# Patient Record
Sex: MALE | Race: WHITE | NOT HISPANIC OR LATINO | Employment: UNEMPLOYED
[De-identification: names, ages, dates, MRNs, and addresses within clinical notes are randomized per-mention and may not be internally consistent; named-entity substitution may affect disease eponyms.]

---

## 2019-11-21 ENCOUNTER — RECORDS - HEALTHEAST (OUTPATIENT)
Dept: ADMINISTRATIVE | Facility: OTHER | Age: 24
End: 2019-11-21

## 2019-12-17 ENCOUNTER — DOCUMENTATION ONLY (OUTPATIENT)
Dept: OTHER | Facility: CLINIC | Age: 24
End: 2019-12-17

## 2019-12-17 RX ORDER — PANTOPRAZOLE SODIUM 40 MG/1
40 TABLET, DELAYED RELEASE ORAL EVERY EVENING
COMMUNITY

## 2019-12-17 RX ORDER — LISINOPRIL 10 MG/1
10 TABLET ORAL DAILY
Status: ON HOLD | COMMUNITY
End: 2024-06-22

## 2019-12-18 ENCOUNTER — HOSPITAL ENCOUNTER (OUTPATIENT)
Facility: CLINIC | Age: 24
Discharge: HOME | End: 2019-12-18
Attending: INTERNAL MEDICINE | Admitting: INTERNAL MEDICINE
Payer: COMMERCIAL

## 2019-12-18 VITALS
RESPIRATION RATE: 15 BRPM | WEIGHT: 225 LBS | BODY MASS INDEX: 36.16 KG/M2 | DIASTOLIC BLOOD PRESSURE: 90 MMHG | HEART RATE: 82 BPM | HEIGHT: 66 IN | SYSTOLIC BLOOD PRESSURE: 127 MMHG | OXYGEN SATURATION: 96 %

## 2019-12-18 LAB — UPPER GI ENDOSCOPY: NORMAL

## 2019-12-18 PROCEDURE — 25000128 H RX IP 250 OP 636: Performed by: INTERNAL MEDICINE

## 2019-12-18 PROCEDURE — 43235 EGD DIAGNOSTIC BRUSH WASH: CPT | Performed by: INTERNAL MEDICINE

## 2019-12-18 PROCEDURE — 25000125 ZZHC RX 250: Performed by: INTERNAL MEDICINE

## 2019-12-18 PROCEDURE — 40000104 ZZH STATISTIC MODERATE SEDATION < 10 MIN: Performed by: INTERNAL MEDICINE

## 2019-12-18 RX ORDER — LIDOCAINE 40 MG/G
CREAM TOPICAL
Status: DISCONTINUED | OUTPATIENT
Start: 2019-12-18 | End: 2019-12-18 | Stop reason: HOSPADM

## 2019-12-18 RX ORDER — NALOXONE HYDROCHLORIDE 0.4 MG/ML
.1-.4 INJECTION, SOLUTION INTRAMUSCULAR; INTRAVENOUS; SUBCUTANEOUS
Status: DISCONTINUED | OUTPATIENT
Start: 2019-12-18 | End: 2019-12-18 | Stop reason: HOSPADM

## 2019-12-18 RX ORDER — FENTANYL CITRATE 50 UG/ML
INJECTION, SOLUTION INTRAMUSCULAR; INTRAVENOUS PRN
Status: DISCONTINUED | OUTPATIENT
Start: 2019-12-18 | End: 2019-12-18 | Stop reason: HOSPADM

## 2019-12-18 RX ORDER — ONDANSETRON 2 MG/ML
4 INJECTION INTRAMUSCULAR; INTRAVENOUS
Status: DISCONTINUED | OUTPATIENT
Start: 2019-12-18 | End: 2019-12-18 | Stop reason: HOSPADM

## 2019-12-18 RX ORDER — ONDANSETRON 2 MG/ML
4 INJECTION INTRAMUSCULAR; INTRAVENOUS EVERY 6 HOURS PRN
Status: DISCONTINUED | OUTPATIENT
Start: 2019-12-18 | End: 2019-12-18 | Stop reason: HOSPADM

## 2019-12-18 RX ORDER — FLUMAZENIL 0.1 MG/ML
0.2 INJECTION, SOLUTION INTRAVENOUS
Status: DISCONTINUED | OUTPATIENT
Start: 2019-12-18 | End: 2019-12-18 | Stop reason: HOSPADM

## 2019-12-18 RX ORDER — ONDANSETRON 4 MG/1
4 TABLET, ORALLY DISINTEGRATING ORAL EVERY 6 HOURS PRN
Status: DISCONTINUED | OUTPATIENT
Start: 2019-12-18 | End: 2019-12-18 | Stop reason: HOSPADM

## 2019-12-18 ASSESSMENT — MIFFLIN-ST. JEOR: SCORE: 1943.34

## 2019-12-18 NOTE — DISCHARGE INSTRUCTIONS
"The patient has received a copy of the Provation  report the doctor has written and discharge instructions have been discussed with the patient and responsible adult.  All questions were addressed and answered prior to patient discharge.      Tips to Control Acid Reflux  To control acid reflux, you ll need to make some basic diet and lifestyle changes. The simple steps outlined below may be all you ll need to relieve discomfort.   Watch What You Eat      Avoid fatty foods and spicy foods.    Eat fewer acidic foods, such as citrus and tomato-based foods. These can increase symptoms.     Limit drinking alcohol, caffeine, and fizzy beverages. All increase acid reflux.    Try limiting chocolate, peppermint, and spearmint. These can worsen acid reflux in some people.    Watch When You Eat    Avoid lying down for 3 hours after eating.    Do not snack before going to bed.    Raise Your Head  Raising your head and upper body by 4\" to 6\" helps limit reflux when you re lying down. Put blocks under the head of the bed frame to raise it.                    [id number] [facility], [address] [place] [address], [place] [address]. All rights reserved. This information is not intended as a substitute for professional medical care. Always follow your healthcare professional's instructions.  Examples of Bed Risers                                                                        "

## 2019-12-18 NOTE — PROGRESS NOTES
[facility]  Pre-Endoscopy History and Physical     Dilshad Causey MRN# [id number]   YOB: 1995 Age: 24 year old   Today's Date: 12/18/2019    Date of Procedure: 12/18/2019  Primary care provider: Laura Clemens  Type of Endoscopy: esophagogastroduodenoscopy (upper GI endoscopy)  Reason for Procedure: Reflux  Type of Anesthesia Anticipated: Moderate (conscious) sedation    HPI:    Dilshad is a 24 year old male who will be undergoing the above procedure.      A history and physical has been performed. The patient's medications and allergies have been reviewed. The risks and benefits of the procedure and the sedation options and risks were discussed with the patient.  All questions were answered and informed consent was obtained.      Allergies   Allergen Reactions     Alcohol [Ethanol] Hives     Hives after drinking ETOH     Cefprozil [Cefprozil] Hives        Current Facility-Administered Medications   Medication     0.9% sodium chloride BOLUS     lidocaine (LMX4) kit     lidocaine 1 % 0.1-1 mL     ondansetron (ZOFRAN) injection 4 mg     sodium chloride (PF) 0.9% PF flush 3 mL     sodium chloride (PF) 0.9% PF flush 3 mL     sodium chloride (PF) 0.9% PF flush 3 mL       Patient Active Problem List   Diagnosis     Tibialis anterior tenosynovitis     Lower leg pain        Past Medical History:   Diagnosis Date     Acne     on amoxicillin     Hypertension      Migraines         Past Surgical History:   Procedure Laterality Date     ESOPHAGOSCOPY, GASTROSCOPY, DUODENOSCOPY (EGD), COMBINED  12/18/2019    Dr. Mac SON     FASCIOTOMY LOWER EXTREMITY  10/30/2011    Procedure:FASCIOTOMY LOWER EXTREMITY; Excision Hematoma LLE, Explore Compartments LLE; Surgeon:ATA MCKEON; [facility]     FASCIOTOMY LOWER EXTREMITY BILATERAL  10/19/2011    Procedure:FASCIOTOMY LOWER EXTREMITY BILATERAL; Bilateral Anterior / Lateral Compartment Fasciotomies  ; Surgeon:ATA MCKEON;  "Location:[facility]     none       ORTHOPEDIC SURGERY  2013    left ankle repair of tendon       Social History     Tobacco Use     Smoking status: Never Smoker     Smokeless tobacco: Never Used   Substance Use Topics     Alcohol use: No       Family History   Problem Relation Age of Onset     Diabetes Father      Neurologic Disorder Mother         migraine     Neurologic Disorder Other         epilipsy     Hypertension Other      High cholesterol Other      Alcohol/Drug Other      Heart Disease Other      Cancer Other      Colon Cancer No family hx of          PHYSICAL EXAM:   BP (!) 128/94   Pulse 74   Resp 10   Ht 1.676 m (5' 6\")   Wt 102.1 kg (225 lb)   SpO2 99%   BMI 36.32 kg/m   Estimated body mass index is 36.32 kg/m  as calculated from the following:    Height as of this encounter: 1.676 m (5' 6\").    Weight as of this encounter: 102.1 kg (225 lb).   RESP: lungs clear to auscultation - no rales, rhonchi or wheezes  CV: regular rates and rhythm    IMPRESSION   ASA Class 3 - Severe systemic disease, but not incapacitating  Mallampati Score: 2      Feliberto Watts MD                "

## 2020-03-01 ENCOUNTER — HEALTH MAINTENANCE LETTER (OUTPATIENT)
Age: 25
End: 2020-03-01

## 2020-12-14 ENCOUNTER — HEALTH MAINTENANCE LETTER (OUTPATIENT)
Age: 25
End: 2020-12-14

## 2021-04-18 ENCOUNTER — HEALTH MAINTENANCE LETTER (OUTPATIENT)
Age: 26
End: 2021-04-18

## 2021-06-04 VITALS — WEIGHT: 227 LBS | WEIGHT: 227 LBS | BODY MASS INDEX: 35.55 KG/M2 | BODY MASS INDEX: 35.55 KG/M2

## 2021-10-02 ENCOUNTER — HEALTH MAINTENANCE LETTER (OUTPATIENT)
Age: 26
End: 2021-10-02

## 2022-05-14 ENCOUNTER — HEALTH MAINTENANCE LETTER (OUTPATIENT)
Age: 27
End: 2022-05-14

## 2022-09-03 ENCOUNTER — HEALTH MAINTENANCE LETTER (OUTPATIENT)
Age: 27
End: 2022-09-03

## 2023-06-03 ENCOUNTER — HEALTH MAINTENANCE LETTER (OUTPATIENT)
Age: 28
End: 2023-06-03

## 2024-06-22 ENCOUNTER — HOSPITAL ENCOUNTER (INPATIENT)
Facility: HOSPITAL | Age: 29
LOS: 4 days | Discharge: HOME | End: 2024-06-26
Attending: STUDENT IN AN ORGANIZED HEALTH CARE EDUCATION/TRAINING PROGRAM | Admitting: INTERNAL MEDICINE
Payer: COMMERCIAL

## 2024-06-22 DIAGNOSIS — K57.92 ACUTE DIVERTICULITIS: Primary | ICD-10-CM

## 2024-06-22 PROCEDURE — 120N000001 HC R&B MED SURG/OB

## 2024-06-22 PROCEDURE — 99223 1ST HOSP IP/OBS HIGH 75: CPT | Performed by: INTERNAL MEDICINE

## 2024-06-22 PROCEDURE — 250N000013 HC RX MED GY IP 250 OP 250 PS 637: Performed by: INTERNAL MEDICINE

## 2024-06-22 PROCEDURE — 258N000003 HC RX IP 258 OP 636: Mod: JZ | Performed by: INTERNAL MEDICINE

## 2024-06-22 RX ORDER — NALOXONE HYDROCHLORIDE 0.4 MG/ML
0.4 INJECTION, SOLUTION INTRAMUSCULAR; INTRAVENOUS; SUBCUTANEOUS
Status: DISCONTINUED | OUTPATIENT
Start: 2024-06-22 | End: 2024-06-26 | Stop reason: HOSPADM

## 2024-06-22 RX ORDER — CALCIUM CARBONATE 500 MG/1
1000 TABLET, CHEWABLE ORAL 4 TIMES DAILY PRN
Status: DISCONTINUED | OUTPATIENT
Start: 2024-06-22 | End: 2024-06-26 | Stop reason: HOSPADM

## 2024-06-22 RX ORDER — HYDROCHLOROTHIAZIDE 25 MG/1
25 TABLET ORAL DAILY
COMMUNITY

## 2024-06-22 RX ORDER — ACETAMINOPHEN 650 MG/1
650 SUPPOSITORY RECTAL EVERY 4 HOURS PRN
Status: DISCONTINUED | OUTPATIENT
Start: 2024-06-22 | End: 2024-06-26 | Stop reason: HOSPADM

## 2024-06-22 RX ORDER — ONDANSETRON 4 MG/1
4 TABLET, ORALLY DISINTEGRATING ORAL EVERY 6 HOURS PRN
Status: DISCONTINUED | OUTPATIENT
Start: 2024-06-22 | End: 2024-06-26 | Stop reason: HOSPADM

## 2024-06-22 RX ORDER — PROCHLORPERAZINE 25 MG
25 SUPPOSITORY, RECTAL RECTAL EVERY 12 HOURS PRN
Status: DISCONTINUED | OUTPATIENT
Start: 2024-06-22 | End: 2024-06-26 | Stop reason: HOSPADM

## 2024-06-22 RX ORDER — AMOXICILLIN 250 MG
1 CAPSULE ORAL 2 TIMES DAILY PRN
Status: DISCONTINUED | OUTPATIENT
Start: 2024-06-22 | End: 2024-06-23

## 2024-06-22 RX ORDER — ACETAMINOPHEN 325 MG/1
650 TABLET ORAL EVERY 4 HOURS PRN
Status: DISCONTINUED | OUTPATIENT
Start: 2024-06-22 | End: 2024-06-26 | Stop reason: HOSPADM

## 2024-06-22 RX ORDER — NALOXONE HYDROCHLORIDE 0.4 MG/ML
0.2 INJECTION, SOLUTION INTRAMUSCULAR; INTRAVENOUS; SUBCUTANEOUS
Status: DISCONTINUED | OUTPATIENT
Start: 2024-06-22 | End: 2024-06-26 | Stop reason: HOSPADM

## 2024-06-22 RX ORDER — NORTRIPTYLINE HCL 10 MG
30 CAPSULE ORAL AT BEDTIME
COMMUNITY

## 2024-06-22 RX ORDER — HYDRALAZINE HYDROCHLORIDE 10 MG/1
10 TABLET, FILM COATED ORAL EVERY 4 HOURS PRN
Status: DISCONTINUED | OUTPATIENT
Start: 2024-06-22 | End: 2024-06-26 | Stop reason: HOSPADM

## 2024-06-22 RX ORDER — HYDRALAZINE HYDROCHLORIDE 20 MG/ML
10 INJECTION INTRAMUSCULAR; INTRAVENOUS EVERY 4 HOURS PRN
Status: DISCONTINUED | OUTPATIENT
Start: 2024-06-22 | End: 2024-06-26 | Stop reason: HOSPADM

## 2024-06-22 RX ORDER — LIDOCAINE 40 MG/G
CREAM TOPICAL
Status: DISCONTINUED | OUTPATIENT
Start: 2024-06-22 | End: 2024-06-26 | Stop reason: HOSPADM

## 2024-06-22 RX ORDER — AMLODIPINE BESYLATE 10 MG/1
10 TABLET ORAL EVERY EVENING
COMMUNITY

## 2024-06-22 RX ORDER — SODIUM CHLORIDE 9 MG/ML
INJECTION, SOLUTION INTRAVENOUS CONTINUOUS
Status: DISCONTINUED | OUTPATIENT
Start: 2024-06-22 | End: 2024-06-23

## 2024-06-22 RX ORDER — PROCHLORPERAZINE MALEATE 10 MG
10 TABLET ORAL EVERY 6 HOURS PRN
Status: DISCONTINUED | OUTPATIENT
Start: 2024-06-22 | End: 2024-06-26 | Stop reason: HOSPADM

## 2024-06-22 RX ORDER — PIPERACILLIN SODIUM, TAZOBACTAM SODIUM 3; .375 G/15ML; G/15ML
3.38 INJECTION, POWDER, LYOPHILIZED, FOR SOLUTION INTRAVENOUS EVERY 8 HOURS
Status: DISCONTINUED | OUTPATIENT
Start: 2024-06-23 | End: 2024-06-26

## 2024-06-22 RX ORDER — AMOXICILLIN 250 MG
2 CAPSULE ORAL 2 TIMES DAILY PRN
Status: DISCONTINUED | OUTPATIENT
Start: 2024-06-22 | End: 2024-06-23

## 2024-06-22 RX ORDER — ONDANSETRON 2 MG/ML
4 INJECTION INTRAMUSCULAR; INTRAVENOUS EVERY 6 HOURS PRN
Status: DISCONTINUED | OUTPATIENT
Start: 2024-06-22 | End: 2024-06-26 | Stop reason: HOSPADM

## 2024-06-22 RX ORDER — HYDROMORPHONE HYDROCHLORIDE 2 MG/1
2 TABLET ORAL EVERY 4 HOURS PRN
Status: DISCONTINUED | OUTPATIENT
Start: 2024-06-22 | End: 2024-06-23

## 2024-06-22 RX ADMIN — ACETAMINOPHEN 650 MG: 325 TABLET ORAL at 22:50

## 2024-06-22 RX ADMIN — SODIUM CHLORIDE: 9 INJECTION, SOLUTION INTRAVENOUS at 22:49

## 2024-06-22 ASSESSMENT — ACTIVITIES OF DAILY LIVING (ADL)
ADLS_ACUITY_SCORE: 35
ADLS_ACUITY_SCORE: 35

## 2024-06-23 LAB
ANION GAP SERPL CALCULATED.3IONS-SCNC: 12 MMOL/L (ref 7–15)
BUN SERPL-MCNC: 9.9 MG/DL (ref 6–20)
CALCIUM SERPL-MCNC: 8.3 MG/DL (ref 8.6–10)
CHLORIDE SERPL-SCNC: 103 MMOL/L (ref 98–107)
CREAT SERPL-MCNC: 1.07 MG/DL (ref 0.67–1.17)
DEPRECATED HCO3 PLAS-SCNC: 25 MMOL/L (ref 22–29)
EGFRCR SERPLBLD CKD-EPI 2021: >90 ML/MIN/1.73M2
ERYTHROCYTE [DISTWIDTH] IN BLOOD BY AUTOMATED COUNT: 13.1 % (ref 10–15)
GLUCOSE SERPL-MCNC: 86 MG/DL (ref 70–99)
HCT VFR BLD AUTO: 41.1 % (ref 40–53)
HGB BLD-MCNC: 13.7 G/DL (ref 13.3–17.7)
MCH RBC QN AUTO: 29.3 PG (ref 26.5–33)
MCHC RBC AUTO-ENTMCNC: 33.3 G/DL (ref 31.5–36.5)
MCV RBC AUTO: 88 FL (ref 78–100)
PLATELET # BLD AUTO: 306 10E3/UL (ref 150–450)
POTASSIUM SERPL-SCNC: 3.7 MMOL/L (ref 3.4–5.3)
RBC # BLD AUTO: 4.68 10E6/UL (ref 4.4–5.9)
SODIUM SERPL-SCNC: 140 MMOL/L (ref 135–145)
WBC # BLD AUTO: 12.1 10E3/UL (ref 4–11)

## 2024-06-23 PROCEDURE — 250N000011 HC RX IP 250 OP 636: Mod: JZ | Performed by: INTERNAL MEDICINE

## 2024-06-23 PROCEDURE — 99232 SBSQ HOSP IP/OBS MODERATE 35: CPT | Performed by: INTERNAL MEDICINE

## 2024-06-23 PROCEDURE — 120N000001 HC R&B MED SURG/OB

## 2024-06-23 PROCEDURE — 85027 COMPLETE CBC AUTOMATED: CPT | Performed by: INTERNAL MEDICINE

## 2024-06-23 PROCEDURE — 80048 BASIC METABOLIC PNL TOTAL CA: CPT | Performed by: INTERNAL MEDICINE

## 2024-06-23 PROCEDURE — 258N000003 HC RX IP 258 OP 636: Mod: JZ | Performed by: INTERNAL MEDICINE

## 2024-06-23 PROCEDURE — 250N000013 HC RX MED GY IP 250 OP 250 PS 637: Performed by: INTERNAL MEDICINE

## 2024-06-23 PROCEDURE — 36415 COLL VENOUS BLD VENIPUNCTURE: CPT | Performed by: INTERNAL MEDICINE

## 2024-06-23 RX ORDER — HYDROMORPHONE HYDROCHLORIDE 1 MG/ML
0.3 INJECTION, SOLUTION INTRAMUSCULAR; INTRAVENOUS; SUBCUTANEOUS
Status: DISCONTINUED | OUTPATIENT
Start: 2024-06-23 | End: 2024-06-26 | Stop reason: HOSPADM

## 2024-06-23 RX ORDER — AMLODIPINE BESYLATE 5 MG/1
10 TABLET ORAL EVERY EVENING
Status: DISCONTINUED | OUTPATIENT
Start: 2024-06-23 | End: 2024-06-26 | Stop reason: HOSPADM

## 2024-06-23 RX ORDER — NORTRIPTYLINE HCL 10 MG
30 CAPSULE ORAL AT BEDTIME
Status: DISCONTINUED | OUTPATIENT
Start: 2024-06-23 | End: 2024-06-26 | Stop reason: HOSPADM

## 2024-06-23 RX ORDER — PANTOPRAZOLE SODIUM 40 MG/1
40 TABLET, DELAYED RELEASE ORAL EVERY EVENING
Status: DISCONTINUED | OUTPATIENT
Start: 2024-06-23 | End: 2024-06-26 | Stop reason: HOSPADM

## 2024-06-23 RX ADMIN — PANTOPRAZOLE SODIUM 40 MG: 40 TABLET, DELAYED RELEASE ORAL at 20:18

## 2024-06-23 RX ADMIN — PIPERACILLIN AND TAZOBACTAM 3.38 G: 3; .375 INJECTION, POWDER, FOR SOLUTION INTRAVENOUS at 10:01

## 2024-06-23 RX ADMIN — NORTRIPTYLINE HYDROCHLORIDE 30 MG: 10 CAPSULE ORAL at 02:23

## 2024-06-23 RX ADMIN — NORTRIPTYLINE HYDROCHLORIDE 30 MG: 10 CAPSULE ORAL at 20:18

## 2024-06-23 RX ADMIN — AMLODIPINE BESYLATE 10 MG: 5 TABLET ORAL at 20:18

## 2024-06-23 RX ADMIN — SODIUM CHLORIDE: 9 INJECTION, SOLUTION INTRAVENOUS at 07:01

## 2024-06-23 RX ADMIN — PIPERACILLIN AND TAZOBACTAM 3.38 G: 3; .375 INJECTION, POWDER, FOR SOLUTION INTRAVENOUS at 02:18

## 2024-06-23 RX ADMIN — PIPERACILLIN AND TAZOBACTAM 3.38 G: 3; .375 INJECTION, POWDER, FOR SOLUTION INTRAVENOUS at 18:41

## 2024-06-23 ASSESSMENT — ACTIVITIES OF DAILY LIVING (ADL)
ADLS_ACUITY_SCORE: 18
ADLS_ACUITY_SCORE: 35
FALL_HISTORY_WITHIN_LAST_SIX_MONTHS: NO
ADLS_ACUITY_SCORE: 18
TOILETING_ISSUES: NO
ADLS_ACUITY_SCORE: 35
WEAR_GLASSES_OR_BLIND: NO
ADLS_ACUITY_SCORE: 18
ADLS_ACUITY_SCORE: 18
DRESSING/BATHING_DIFFICULTY: NO
DOING_ERRANDS_INDEPENDENTLY_DIFFICULTY: NO
ADLS_ACUITY_SCORE: 35
ADLS_ACUITY_SCORE: 35
ADLS_ACUITY_SCORE: 18
WALKING_OR_CLIMBING_STAIRS_DIFFICULTY: NO
ADLS_ACUITY_SCORE: 35
CONCENTRATING,_REMEMBERING_OR_MAKING_DECISIONS_DIFFICULTY: NO
ADLS_ACUITY_SCORE: 18
ADLS_ACUITY_SCORE: 18
CHANGE_IN_FUNCTIONAL_STATUS_SINCE_ONSET_OF_CURRENT_ILLNESS/INJURY: NO
DIFFICULTY_EATING/SWALLOWING: NO
ADLS_ACUITY_SCORE: 18

## 2024-06-23 NOTE — CONSULTS
[facility]   Consultation      Place of Service: [facility]  Reason for Consultation: Diverticulitis   Consult Requested by: Dr. Loly Hollins    History of Present Illness: Mr. Causey is a 28M who presents with acute diverticulitis.  He states that he was in his usual state of health until about 1 week ago when he developed left lower quadrant abdominal pain.  He also had subjective fevers and chills.  He had nausea without vomiting.  He thought that things were initially getting a little bit better so he tried to wait it out but then 2 days prior to admission, he developed a fever to 101 so he went to the emergency room where he had a CT scan.  This demonstrated findings of diverticulitis with a local perforation and so he was transferred to [facility].    General surgery was initially consulted, however we were asked for management recommendations given a previous question of Crohn's that turned out to be likely an infectious gastroenteritis.  He was initially seen for this in 2021 and a CT demonstrated terminal ileitis.  He followed up with Dr. Turner from [facility] as an outpatient.  He had a normal colonoscopy with intubation of the terminal ileum.  However, given his erratic bowel habits, he has been treated for IBS.    He is otherwise fairly healthy.  He has never had diverticulitis before.  He has hypertension, migraines, and his BMI is 36.  He has never had abdominal surgery although he has had kidney stones and bilateral lower extremity fasciotomies for chronic compartment syndrome.  He does not smoke cigarettes and he is allergic to alcohol.  He has no family history of colon polyps, colon cancer, or inflammatory bowel disease.    Past Medical History:   Diagnosis Date    Acne     on amoxicillin    Hypertension     Migraines        Allergies   Allergen Reactions    Alcohol [Ethanol] Hives     Hives after drinking ETOH    Cefprozil [Cefprozil] Hives     6/22/24  Zosyn given at [facility]    Lisinopril Cough       Past Surgical History:   Procedure Laterality Date    ANKLE SURGERY Left     tendon surgery    COMBINED CYSTOSCOPY, INSERT STENT URETER(S) Right 6/12/2015    Procedure: CYSTOSCOPY URETEROSCOPIC STONE EXTRACTION WITH STENT PLACEMENT;  Surgeon: David Garcia MD;  Location: [facility];  Service:     ESOPHAGOSCOPY, GASTROSCOPY, DUODENOSCOPY (EGD), COMBINED  12/18/2019    Dr. Watts  [facility]    ESOPHAGOSCOPY, GASTROSCOPY, DUODENOSCOPY (EGD), COMBINED N/A 12/18/2019    Procedure: ESOPHAGOGASTRODUODENOSCOPY (EGD);  Surgeon: Feliberto Watts MD;  Location:  GI    FASCIOTOMY Bilateral     FASCIOTOMY LOWER EXTREMITY  10/30/2011    Procedure:FASCIOTOMY LOWER EXTREMITY; Excision Hematoma LLE, Explore Compartments LLE; Surgeon:FELIBERTO MCKEON; [facility]    FASCIOTOMY LOWER EXTREMITY BILATERAL  10/19/2011    Procedure:FASCIOTOMY LOWER EXTREMITY BILATERAL; Bilateral Anterior / Lateral Compartment Fasciotomies  ; Surgeon:FELIBERTO MCKEON; Location: OR    none      ORTHOPEDIC SURGERY  2013    left ankle repair of tendon       Family History   Problem Relation Age of Onset    Diabetes Father         type 1    Neurologic Disorder Mother         migraine    Neurologic Disorder Other         epilipsy    Hypertension Other     High cholesterol Other     Alcohol/Drug Other     Heart Disease Other     Cancer Other     Colon Cancer No family hx of     Cancer Maternal Grandmother         unkown source    Heart Disease Maternal Grandfather         quadruple bypass    Diabetes Paternal Grandmother         type 2    Cancer Paternal Grandmother         renal    Cancer Paternal Grandfather         bladder and non hodgkin's lymphoma    Heart Disease Paternal Grandfather         heart stents       Social History     Socioeconomic History    Marital status: Single     Spouse name: Not on file    Number of children: Not on file    Years of education:  Not on file    Highest education level: Not on file   Occupational History    Not on file   Tobacco Use    Smoking status: Never    Smokeless tobacco: Never   Substance and Sexual Activity    Alcohol use: No    Drug use: No    Sexual activity: Not on file   Other Topics Concern    Parent/sibling w/ CABG, MI or angioplasty before 65F 55M? Not Asked   Social History Narrative    Not on file     Social Determinants of Health     Financial Resource Strain: Low Risk  (2/2/2024)    [facility]    Financial Resource Strain     Difficulty of Paying Living Expenses: 3     Difficulty of Paying Living Expenses: Not on file   Food Insecurity: No Food Insecurity (2/2/2024)    Received from [facility]    Food Insecurity     Worried About Running Out of Food in the Last Year: 1   Transportation Needs: No Transportation Needs (2/2/2024)    [facility]    Transportation Needs     Lack of Transportation (Medical): 1   Physical Activity: Not on file   Stress: Not on file   Social Connections: Socially Integrated (2/2/2024)    [facility]    Social Connections     Frequency of Communication with Friends and Family: 0   Interpersonal Safety: Not on file   Housing Stability: Low Risk  (2/2/2024)    [facility]    Housing Stability     Unable to Pay for Housing in the Last Year: 1       Current Facility-Administered Medications   Medication Dose Route Frequency Provider Last Rate Last Admin    acetaminophen (TYLENOL) tablet 650 mg  650 mg Oral Q4H PRN Ashley Bhatti MD   650 mg at 06/22/24 2250    Or    acetaminophen (TYLENOL) Suppository 650 mg  650 mg Rectal Q4H PRN Ashley Bhatti MD        amLODIPine (NORVASC) tablet 10 mg  10 mg Oral QPM Ashley Bhatti MD        calcium carbonate (TUMS) chewable tablet  1,000 mg  1,000 mg Oral 4x Daily PRN Ashley Bhatti MD        hydrALAZINE (APRESOLINE) tablet 10 mg  10 mg Oral Q4H PRN Ashley Bhatti MD        Or    hydrALAZINE (APRESOLINE) injection 10 mg  10 mg Intravenous Q4H PRN Ashley Bhatti MD        HYDROmorphone (DILAUDID) half-tab 1 mg  1 mg Oral Q4H PRN Ashley Bhatti MD        HYDROmorphone (DILAUDID) tablet 2 mg  2 mg Oral Q4H PRN Ashley Bhatti MD        lidocaine (LMX4) cream   Topical Q1H PRN Ashley Bhatti MD        lidocaine 1 % 0.1-1 mL  0.1-1 mL Other Q1H PRN Ashley Bhatti MD        melatonin tablet 5 mg  5 mg Oral At Bedtime PRN Ashley Bhatti MD        naloxone (NARCAN) injection 0.2 mg  0.2 mg Intravenous Q2 Min PRN Ashley Bhatti MD        Or    naloxone (NARCAN) injection 0.4 mg  0.4 mg Intravenous Q2 Min PRN Ashley Bhatti MD        Or    naloxone (NARCAN) injection 0.2 mg  0.2 mg Intramuscular Q2 Min PRN Ashley Bhatti MD        Or    naloxone (NARCAN) injection 0.4 mg  0.4 mg Intramuscular Q2 Min PRN Ashley Bhatti MD        nortriptyline (PAMELOR) capsule 30 mg  30 mg Oral At Bedtime Ashley Bhatti MD   30 mg at 06/23/24 0223    ondansetron (ZOFRAN ODT) ODT tab 4 mg  4 mg Oral Q6H PRN Ashley Bhatti MD        Or    ondansetron (ZOFRAN) injection 4 mg  4 mg Intravenous Q6H PRN Ashley Bhatti MD        pantoprazole (PROTONIX) EC tablet 40 mg  40 mg Oral QPM Ashley Bhatti MD        piperacillin-tazobactam (ZOSYN) 3.375 g vial to attach to  mL bag  3.375 g Intravenous Q8H Ashley Bhatti MD   3.375 g at 06/23/24 1001    prochlorperazine (COMPAZINE) injection 10 mg  10 mg Intravenous Q6H PRN Ashley Bhatti MD        Or    prochlorperazine (COMPAZINE) tablet 10 mg  10 mg Oral Q6H PRN Ashley Bhatti MD        Or    prochlorperazine (COMPAZINE) suppository 25 mg  25 mg Rectal Q12H PRN Ashley Bhatti MD        senna-docusate (SENOKOT-S/PERICOLACE) 8.6-50  MG per tablet 1 tablet  1 tablet Oral BID PRN Ashley Bhatti MD        Or    senna-docusate (SENOKOT-S/PERICOLACE) 8.6-50 MG per tablet 2 tablet  2 tablet Oral BID PRN Ashley Bhatti MD        sodium chloride (PF) 0.9% PF flush 3 mL  3 mL Intracatheter Q8H Ashley Bhatti MD   3 mL at 06/22/24 2249    sodium chloride (PF) 0.9% PF flush 3 mL  3 mL Intracatheter q1 min prn Ashley Bhatti MD        sodium chloride 0.9 % infusion   Intravenous Continuous Ashley Bhatti  mL/hr at 06/23/24 0701 New Bag at 06/23/24 0701       Review of Systems:   A full 10 point review of systems was taken and is negative aside from what is noted above in the HPI.    Intake/Output past 24 hrs:    Intake/Output Summary (Last 24 hours) at 6/23/2024 1325  Last data filed at 6/23/2024 0701  Gross per 24 hour   Intake 1002 ml   Output --   Net 1002 ml       Physical Exam:  Temp:  [97.8  F (36.6  C)-100.4  F (38  C)] 98.7  F (37.1  C)  Pulse:  [] 77  Resp:  [16-20] 18  BP: (125-133)/(69-83) 129/74  SpO2:  [96 %-98 %] 97 %  General: NAD, alert, cooperative  Head: normocephalic, without abnormality / atraumatic  Respiratory: non-labored breathing  Abdomen: Soft, mild to moderately tender in the bilateral lower quadrants without rebound or guarding.  No focal or generalized peritonitis.  No overlying skin changes.  No hernia or ascites.  No hepatosplenomegaly.  Skin: no rashes or lesions  Musculoskeletal: moves all four extremities equally; no calf edema or tenderness  Psychological: alert and oriented, answers questions appropriately  Neurological: cranial nerves grossly intact    CBC RESULTS:   Recent Labs   Lab Test 06/23/24  0608   WBC 12.1*   RBC 4.68   HGB 13.7   HCT 41.1   MCV 88   MCH 29.3   MCHC 33.3   RDW 13.1          Last Comprehensive Metabolic Panel:  Sodium   Date Value Ref Range Status   06/23/2024 140 135 - 145 mmol/L Final     Comment:     Reference intervals for this test were updated on  09/26/2023 to more accurately reflect our healthy population. There may be differences in the flagging of prior results with similar values performed with this method. Interpretation of those prior results can be made in the context of the updated reference intervals.    10/30/2011 142 133 - 144 mmol/L Final     Potassium   Date Value Ref Range Status   06/23/2024 3.7 3.4 - 5.3 mmol/L Final   10/30/2011 4.3 3.4 - 5.3 mmol/L Final     Chloride   Date Value Ref Range Status   06/23/2024 103 98 - 107 mmol/L Final   10/30/2011 101 98 - 110 mmol/L Final     Carbon Dioxide   Date Value Ref Range Status   10/30/2011 30 20 - 32 mmol/L Final     Carbon Dioxide (CO2)   Date Value Ref Range Status   06/23/2024 25 22 - 29 mmol/L Final     Anion Gap   Date Value Ref Range Status   06/23/2024 12 7 - 15 mmol/L Final   10/30/2011 10.2 6 - 17 mmol/L Final     Glucose   Date Value Ref Range Status   06/23/2024 86 70 - 99 mg/dL Final   10/30/2011 91 60 - 99 mg/dL Final     Urea Nitrogen   Date Value Ref Range Status   06/23/2024 9.9 6.0 - 20.0 mg/dL Final   10/30/2011 13 5 - 24 mg/dL Final     Creatinine   Date Value Ref Range Status   06/23/2024 1.07 0.67 - 1.17 mg/dL Final   10/30/2011 1.10 (H) 0.50 - 1.00 mg/dL Final     GFR Estimate   Date Value Ref Range Status   06/23/2024 >90 >60 mL/min/1.73m2 Final     Comment:     eGFR calculated using 2021 CKD-EPI equation.   10/30/2011 87 >60 mL/min/1.7m2 Final     Calcium   Date Value Ref Range Status   06/23/2024 8.3 (L) 8.6 - 10.0 mg/dL Final   10/30/2011 10.0 8.7 - 10.8 mg/dL Final       Imaging: I have reviewed the report of the CT scan demonstrating left lower quadrant inflammation presumed to be secondary to diverticulitis with a complex of fluid and gas collection along it but not organized enough to be an abscess.  I do not have images to this actual CT scan as it is through the urgency room, but we will work on getting these images pushed over to us.    Assessment: Dilshad Calabrese  Marium is a 28M with a BMI of 36 and IBS presenting with complicated diverticulitis.    We had a detailed discussion regarding the etiology and treatment of diverticulitis.  Regarding his possible history of Crohn's disease, it seems more likely that his prior episode of ileitis was secondary to a transient gastroenteritis as he had a normal colonoscopy.    Moving forward, we discussed the plan for medical management including keeping him n.p.o., on IV fluids, and on IV antibiotics.  We will slowly progress him forward as appropriate.  I will work on getting the images transferred over.  However, we discussed that there is a fairly high likelihood that this pericolonic phlegmon could turn into an abscess which would present as worsening pain, fever, or leukocytosis.  If this happened, I would plan on repeating his CT scan to see if this is something that we could percutaneously drained.  It is unlikely that he will need surgery for this problem during this hospitalization but we will follow closely.    As his only medical problem here is diverticulitis, I will plan on admitting him to my service and the hospitalist service can sign off.    Plan:  1. Transfer to Colorectal Surgery  2. Continue current care plan  3. Obtain images of CT scan  4. Attempt non-operative management with bowel rest, IV fluids, and IV antibiotics.  We may need to repeat CT scan during this hospitalization.  5.  Discontinue senna and oral pain medications    Medical Decision Making:   Additional workup / testing ordered: Obtaining outside hospital CT scan images  Procedure / Surgery recommended: None at this point  Old records reviewed: Prior CT scan, prior hospital notes, outpatient gastroenterology notes  Medications added / changed: None    Thank you for consulting [facility] regarding this patient's care. Please contact us with questions or concerns.     A total of 45 minutes was spent in consultation including  non-face-to-face time.     Segun Khan MD, BLANE  [facility]  Office: [phone]  6/23/2024 1:25 PM

## 2024-06-23 NOTE — H&P
"[facility]    History and Physical - Hospitalist Service       Date of Admission:  6/22/2024    Assessment & Plan      Dilshad Causey is a 28 year old male 28 year old male with PMH of hypertension, migraines, obesity who presented with lower abdominal pain, fever, leukocytosis and CT suggestive of acute perforated diverticulitis     Acute sigmoid diverticulitis with perforation.  CT shows complex fluid and air bubbles within the pericolonic fat but no organized fluid collection.  Patient is nontoxic-appearing and has stable vital signs.  N.p.o., IV fluids, continue IV Zosyn.  He has history of terminal ileitis and colitis in 2021 with some concerns for possible Crohn's disease.  After discussion with general surgery will consult colorectal surgery for further recommendations  Essential hypertension.  Continue amlodipine, hold hydrochlorothiazide  Migraines.  Continue nortriptyline  Fatty liver.  Outpatient follow-up  Obesity Body mass index is 36.77 kg/m .        Diet: NPO for Medical/Clinical Reasons Except for: Meds, Ice Chips    DVT Prophylaxis: Ambulate every shift  Eng Catheter: Not present  Lines: None     Cardiac Monitoring: None  Code Status: Full Code      Clinically Significant Risk Factors Present on Admission                              # Obesity: Estimated body mass index is 36.77 kg/m  as calculated from the following:    Height as of this encounter: 1.702 m (5' 7\").    Weight as of this encounter: 106.5 kg (234 lb 12.6 oz).              Disposition Plan     Medically Ready for Discharge: Anticipated in 2-4 Days           Ashley Bhatti MD  Hospitalist Service  [facility]  Securely message with [employer] (more info)  Text page via [employer] Paging/Directory     ______________________________________________________________________    Chief Complaint   Lower abdominal pain    History is obtained from the patient    History of Present Illness "   Dilshad Causey is a 28 year old male with PMH of hypertension, migraines who was admitted directly from the urgency room for further evaluation and management of acute perforated diverticulitis    Patient reports he started having lower abdominal pain a week ago.  Pain was fairly constant for about 2 days then completely resolved.  Pain recurred couple of days ago and has been intermittent until last night when it got progressively worse.  He denies any nausea.  He had fever 101.2 today.  Stool has been smaller and foamy but nonbloody.      Labs were significant for WBC 18.2, normal lactate, mildly elevated ALT.  CT abdomen and pelvis showed inflammatory changes in the left lower quadrant with complex fluid and gas collection along the margin of the colon with no organized abscess    Patient has history of terminal ileitis and colitis in 2021.  At that time he had bloody diarrhea and severe pain.  He had colonoscopy 12/16/2021 which did not show any active inflammation.       Past Medical History    Past Medical History:   Diagnosis Date    Acne     on amoxicillin    Hypertension     Migraines        Past Surgical History   Past Surgical History:   Procedure Laterality Date    ANKLE SURGERY Left     tendon surgery    COMBINED CYSTOSCOPY, INSERT STENT URETER(S) Right 6/12/2015    Procedure: CYSTOSCOPY URETEROSCOPIC STONE EXTRACTION WITH STENT PLACEMENT;  Surgeon: David Garcia MD;  Location: [facility];  Service:     ESOPHAGOSCOPY, GASTROSCOPY, DUODENOSCOPY (EGD), COMBINED  12/18/2019    Dr. Watts  [facility]    ESOPHAGOSCOPY, GASTROSCOPY, DUODENOSCOPY (EGD), COMBINED N/A 12/18/2019    Procedure: ESOPHAGOGASTRODUODENOSCOPY (EGD);  Surgeon: Feliberto Watts MD;  Location:  GI    FASCIOTOMY Bilateral     FASCIOTOMY LOWER EXTREMITY  10/30/2011    Procedure:FASCIOTOMY LOWER EXTREMITY; Excision Hematoma LLE, Explore Compartments LLE; Surgeon:FELIBERTO MCKEON; Location: OR     FASCIOTOMY LOWER EXTREMITY BILATERAL  10/19/2011    Procedure:FASCIOTOMY LOWER EXTREMITY BILATERAL; Bilateral Anterior / Lateral Compartment Fasciotomies  ; Surgeon:ATA MCKEON; Location:[facility]    none      ORTHOPEDIC SURGERY  2013    left ankle repair of tendon       Prior to Admission Medications   Prior to Admission Medications   Prescriptions Last Dose Informant Patient Reported? Taking?   ORDER FOR DME   No No   Sig: Equipment being ordered: Trilock ankle brace for left ankle   ORDER FOR DME   No No   Sig: Equipment being ordered: Long leg aircast for right leg   amLODIPine (NORVASC) 10 MG tablet 6/21/2024 at pm  Yes Yes   Sig: Take 10 mg by mouth every evening   hydrochlorothiazide (HYDRODIURIL) 25 MG tablet 6/22/2024 at am  Yes Yes   Sig: Take 25 mg by mouth daily   nortriptyline (PAMELOR) 10 MG capsule 6/21/2024 at hs  Yes Yes   Sig: Take 30 mg by mouth at bedtime   pantoprazole (PROTONIX) 40 MG EC tablet 6/21/2024 at pm  Yes Yes   Sig: Take 40 mg by mouth every evening      Facility-Administered Medications: None        Review of Systems    The 10 point Review of Systems is negative other than noted in the HPI or here.     Social History   I have reviewed this patient's social history and updated it with pertinent information if needed.  Social History     Tobacco Use    Smoking status: Never    Smokeless tobacco: Never   Substance Use Topics    Alcohol use: No    Drug use: No        Physical Exam   Vital Signs: Temp: 99.2  F (37.3  C) Temp src: Oral BP: 133/83 Pulse: 107   Resp: 20 SpO2: 97 % O2 Device: None (Room air)    Weight: 234 lbs 12.64 oz    General Appearance: Obese male in no acute distress  Respiratory: Lungs are clear anteriorly  Cardiovascular: Regular rate and rhythm.  Normal S1-S2, mild tachycardia  GI: Abdomen soft, nondistended, tender in the left lower abdomen mostly on the left, no rebound or rigidity, bowel sounds present  Skin: No rashes on exposed areas  Other: Awake and  alert, nonfocal    Medical Decision Making       75 MINUTES SPENT BY ME on the date of service doing chart review, history, exam, documentation & further activities per the note.  MANAGEMENT DISCUSSED with the following over the past 24 hours: Patient, family, nursing staff, surgery Dr. Regalado        Data         Imaging results reviewed over the past 24 hrs:   Recent Results (from the past 24 hour(s))   CT Abdomen Pelvis w Contrast    Narrative    For Patients: As a result of the 21st Century Cures Act, medical imaging exams and procedure reports are released immediately into your electronic medical record. You may view this report before your referring provider. If you have questions, please contact your health care provider.    EXAM: CT ABDOMEN PELVIS W  LOCATION: THE URGENCY ROOM [place]  DATE: 6/22/2024    INDICATION: LLQ abdominal pain. RLQ abdominal pain (Age >= 14y).  COMPARISON: CT 11/30/2021.  TECHNIQUE: CT scan of the abdomen and pelvis was performed following injection of IV contrast. Multiplanar reformats were obtained. Dose reduction techniques were used.  CONTRAST: IOPAMIDOL 300 MG/ML  ML BOTTLE: 100mL    FINDINGS:   LOWER CHEST: Normal.    HEPATOBILIARY: Mild fatty infiltration of the liver. No calcified gallstones or biliary dilatation.    PANCREAS: Normal.    SPLEEN: Normal.    ADRENAL GLANDS: Normal.    KIDNEYS/BLADDER: Normal.    BOWEL: Moderate inflammatory changes identified in the left lower quadrant along the anterior margin of the mid sigmoid colon secondary to perforated diverticulitis. There is complex fluid and multiple air bubbles within the pericolonic fat anterior to the colon. The fluid collection is not organized at this point but is certainly at risk for developing an abscess. This measures approximately 5.1 x 3.8 cm. The remainder of the bowel is unremarkable.    LYMPH NODES: Normal.    VASCULATURE: Normal.    PELVIC ORGANS: Normal.    MUSCULOSKELETAL: Normal.    Impression     1.  Inflammatory changes in the left lower quadrant which appears to be secondary to perforated diverticulitis. Complex fluid and gas collection lies along the margin of the colon and extends anteriorly with measurements as described above. This does not appear to be organized to reflect an abscess at this time but is definitely at risk for developing an abscess based upon its appearance.  2.  Hepatic steatosis.

## 2024-06-23 NOTE — PROGRESS NOTES
Received consult, reviewed imaging, appears to have prior terminal ileitis and concern for Crohn's, unclear if this has been diagnosed, follows with [facility].  I would recommend colorectal surgery consult.  Discussed with hospitalist, happy to see if needed.    Fco Regalado MD

## 2024-06-23 NOTE — PLAN OF CARE
Goal Outcome Evaluation:       Pt reports minimal abdominal pain in this shift. Vss except elevated temp. Scheduled abx administered. NPO status maintained with ice chips exception.independent.

## 2024-06-23 NOTE — PROGRESS NOTES
"[facility]    Medicine Progress Note - Hospitalist Service    Date of Admission:  6/22/2024    Assessment & Plan    Dilshad Causey is a 28 year old male with history of hypertension, migraines, obesity who presented with lower abdominal pain, fever, leukocytosis and CT suggestive of acute perforated diverticulitis.    He was placed on IV Zosyn on admission.  Surgery service recommends evaluation by colorectal surgeon.  Awaiting colorectal surgery evaluation.    Acute sigmoid diverticulitis with perforation.    CT shows complex fluid and air bubbles within the pericolonic fat but no organized fluid collection.  He had terminal ileitis and colitis in 2021 with some concerns for possible Crohn's disease.  After discussion with general surgery will consult colorectal surgery for further recommendations    Keep patient n.p.o. for now  Continue IV fluid  Continue IV Zosyn  Analgesics as needed  Antiemetics as needed  Follow-up colorectal surgery consult      Essential hypertension  Continue amlodipine  Continue to hold hydrochlorothiazide    Migraines  Continue nortriptyline    Morbid obesity   Fatty liver  Weight loss through lifestyle modification  Monitor LFT  Outpatient follow-up      Diet: NPO for Medical/Clinical Reasons Except for: Meds, Ice Chips    DVT Prophylaxis: Pneumatic Compression Devices  Eng Catheter: Not present  Lines: None     Cardiac Monitoring: None  Code Status: Full Code      Clinically Significant Risk Factors Present on Admission                              # Obesity: Estimated body mass index is 36.77 kg/m  as calculated from the following:    Height as of this encounter: 1.702 m (5' 7\").    Weight as of this encounter: 106.5 kg (234 lb 12.6 oz).              Disposition Plan     Medically Ready for Discharge: Anticipated-2 to 4 days      Loly Hollins MD  Hospitalist Service  [facility]  Securely message with [employer] (more " info)  Text page via [facility] Paging/Directory   ______________________________________________________________________    Interval History   No new complaints today and no acute events occurred overnight. The patient reports mild left lower quadrant abdominal pain. He mentions that he usually sleeps on his belly but is unable to do so due to pain from diverticulitis. He denies experiencing fever, chills, nausea, or vomiting.    Physical Exam   Vital Signs: Temp: 98.7  F (37.1  C) Temp src: Oral BP: 129/74 Pulse: 77   Resp: 18 SpO2: 97 % O2 Device: None (Room air)    Weight: 234 lbs 12.64 oz    General appearance: Obese, awake, Alert, Cooperative, not in any obvious distress and appears stated age   HEENT: Normocephalic, atraumatic, conjunctiva clear without icterus and ears without discharge  Lungs: Clear to auscultation bilaterally, no wheezing, good air exchange, normal work of breathing  Cardiovascular: Regular Rate and Rythm, normal apical impulse, normal S1 and S2, no lower extremity edema bilaterally  Abdomen: Soft, non-tender and Non-distended, active bowel sounds  Skin: Skin color, texture normal and bruising or bleeding. No rashes or lesions over face, neck, arms and legs, turgor normal.  Musculoskeletal: No bony deformities or joint tenderness. Normal ROM upon flexion & extension.   Neurologic: Alert & Oriented X 3, Facial symmetry preserved and upper & lower extremities moving well with symmetry  Psychiatric: Calm, normal eye contact and normal affect      Medical Decision Making       40 MINUTES SPENT BY ME on the date of service doing chart review, history, exam, documentation & further activities per the note.      Data     I have personally reviewed the following data over the past 24 hrs:    12.1 (H)  \   13.7   / 306     140 103 9.9 /  86   3.7 25 1.07 \       Imaging results reviewed over the past 24 hrs:   Recent Results (from the past 24 hour(s))   CT Abdomen Pelvis w Contrast    Narrative    For  Patients: As a result of the 21st Century Cures Act, medical imaging exams and procedure reports are released immediately into your electronic medical record. You may view this report before your referring provider. If you have questions, please contact your health care provider.    EXAM: CT ABDOMEN PELVIS W  LOCATION: THE URGENCY ROOM [place]  DATE: 6/22/2024    INDICATION: LLQ abdominal pain. RLQ abdominal pain (Age >= 14y).  COMPARISON: CT 11/30/2021.  TECHNIQUE: CT scan of the abdomen and pelvis was performed following injection of IV contrast. Multiplanar reformats were obtained. Dose reduction techniques were used.  CONTRAST: IOPAMIDOL 300 MG/ML  ML BOTTLE: 100mL    FINDINGS:   LOWER CHEST: Normal.    HEPATOBILIARY: Mild fatty infiltration of the liver. No calcified gallstones or biliary dilatation.    PANCREAS: Normal.    SPLEEN: Normal.    ADRENAL GLANDS: Normal.    KIDNEYS/BLADDER: Normal.    BOWEL: Moderate inflammatory changes identified in the left lower quadrant along the anterior margin of the mid sigmoid colon secondary to perforated diverticulitis. There is complex fluid and multiple air bubbles within the pericolonic fat anterior to the colon. The fluid collection is not organized at this point but is certainly at risk for developing an abscess. This measures approximately 5.1 x 3.8 cm. The remainder of the bowel is unremarkable.    LYMPH NODES: Normal.    VASCULATURE: Normal.    PELVIC ORGANS: Normal.    MUSCULOSKELETAL: Normal.    Impression    1.  Inflammatory changes in the left lower quadrant which appears to be secondary to perforated diverticulitis. Complex fluid and gas collection lies along the margin of the colon and extends anteriorly with measurements as described above. This does not appear to be organized to reflect an abscess at this time but is definitely at risk for developing an abscess based upon its appearance.  2.  Hepatic steatosis.

## 2024-06-23 NOTE — PROGRESS NOTES
Pt arrives to unit around 2200. Pt denies pain. NS running 125mL/hr. Low grade fever 99.2 F. PRN Tylenol given. Pt independent in the room other VSS. A&O x4

## 2024-06-23 NOTE — PHARMACY-ADMISSION MEDICATION HISTORY
Pharmacist Admission Medication History    Admission medication history is complete. The information provided in this note is only as accurate as the sources available at the time of the update.    Information Source(s): Patient via in-person    Pertinent Information:   Patient has not had todays dose of Nortriptyline, Amlodipine, or Pantoprazole yet today.     Changes made to PTA medication list:  Added: None  Deleted: None  Changed: None    Allergies reviewed with patient and updates made in EHR: yes Lisinopril (cough) added to allergy list.    Medication History Completed By: TITI MORROW RPH 6/22/2024 11:14 PM    PTA Med List   Medication Sig Last Dose    amLODIPine (NORVASC) 10 MG tablet Take 10 mg by mouth every evening 6/21/2024 at pm    hydrochlorothiazide (HYDRODIURIL) 25 MG tablet Take 25 mg by mouth daily 6/22/2024 at am    nortriptyline (PAMELOR) 10 MG capsule Take 30 mg by mouth at bedtime 6/21/2024 at hs    pantoprazole (PROTONIX) 40 MG EC tablet Take 40 mg by mouth every evening 6/21/2024 at pm

## 2024-06-23 NOTE — PROGRESS NOTES
The colorectal surgeon has assumed care of the patient for further management of complicated diverticulitis. As diverticulitis is the only acute problem, the hospitalist service will sign off.

## 2024-06-23 NOTE — PLAN OF CARE
Pt denied pain/nausea.  IVF and IV antibiotics continue.  Colorectal consult done. Please see note by Dr. Khan.  Father present and updated.

## 2024-06-24 PROCEDURE — 250N000011 HC RX IP 250 OP 636: Mod: JZ | Performed by: INTERNAL MEDICINE

## 2024-06-24 PROCEDURE — 250N000013 HC RX MED GY IP 250 OP 250 PS 637: Performed by: INTERNAL MEDICINE

## 2024-06-24 PROCEDURE — 120N000001 HC R&B MED SURG/OB

## 2024-06-24 PROCEDURE — 258N000003 HC RX IP 258 OP 636: Mod: JZ

## 2024-06-24 PROCEDURE — 250N000011 HC RX IP 250 OP 636: Mod: JZ | Performed by: STUDENT IN AN ORGANIZED HEALTH CARE EDUCATION/TRAINING PROGRAM

## 2024-06-24 RX ORDER — SODIUM CHLORIDE, SODIUM LACTATE, POTASSIUM CHLORIDE, CALCIUM CHLORIDE 600; 310; 30; 20 MG/100ML; MG/100ML; MG/100ML; MG/100ML
INJECTION, SOLUTION INTRAVENOUS CONTINUOUS
Status: DISCONTINUED | OUTPATIENT
Start: 2024-06-24 | End: 2024-06-24

## 2024-06-24 RX ORDER — ENOXAPARIN SODIUM 100 MG/ML
40 INJECTION SUBCUTANEOUS EVERY 24 HOURS
Status: DISCONTINUED | OUTPATIENT
Start: 2024-06-24 | End: 2024-06-26 | Stop reason: HOSPADM

## 2024-06-24 RX ORDER — SODIUM CHLORIDE 9 MG/ML
INJECTION, SOLUTION INTRAVENOUS CONTINUOUS
Status: DISCONTINUED | OUTPATIENT
Start: 2024-06-24 | End: 2024-06-25

## 2024-06-24 RX ADMIN — NORTRIPTYLINE HYDROCHLORIDE 30 MG: 10 CAPSULE ORAL at 20:29

## 2024-06-24 RX ADMIN — AMLODIPINE BESYLATE 10 MG: 5 TABLET ORAL at 20:29

## 2024-06-24 RX ADMIN — ENOXAPARIN SODIUM 40 MG: 40 INJECTION SUBCUTANEOUS at 09:40

## 2024-06-24 RX ADMIN — SODIUM CHLORIDE: 9 INJECTION, SOLUTION INTRAVENOUS at 14:51

## 2024-06-24 RX ADMIN — PIPERACILLIN AND TAZOBACTAM 3.38 G: 3; .375 INJECTION, POWDER, FOR SOLUTION INTRAVENOUS at 09:40

## 2024-06-24 RX ADMIN — PIPERACILLIN AND TAZOBACTAM 3.38 G: 3; .375 INJECTION, POWDER, FOR SOLUTION INTRAVENOUS at 18:41

## 2024-06-24 RX ADMIN — PIPERACILLIN AND TAZOBACTAM 3.38 G: 3; .375 INJECTION, POWDER, FOR SOLUTION INTRAVENOUS at 02:08

## 2024-06-24 RX ADMIN — PANTOPRAZOLE SODIUM 40 MG: 40 TABLET, DELAYED RELEASE ORAL at 20:29

## 2024-06-24 ASSESSMENT — ACTIVITIES OF DAILY LIVING (ADL)
ADLS_ACUITY_SCORE: 20
ADLS_ACUITY_SCORE: 18
ADLS_ACUITY_SCORE: 18
ADLS_ACUITY_SCORE: 20
ADLS_ACUITY_SCORE: 18
ADLS_ACUITY_SCORE: 20
ADLS_ACUITY_SCORE: 20
ADLS_ACUITY_SCORE: 18
ADLS_ACUITY_SCORE: 20
ADLS_ACUITY_SCORE: 18
ADLS_ACUITY_SCORE: 20

## 2024-06-24 NOTE — PROGRESS NOTES
COLON & RECTAL SURGERY  PROGRESS NOTE    06/24/2024    SUBJECTIVE:  Feels well this morning. No abd pain although he has never had any. Continues to have the same lower abdominal pressure he presented with which is stable from yesterday. No fevers overnight. Feels hungry. Passing flatus.    OBJECTIVE:  Temp:  [97.5  F (36.4  C)-98.9  F (37.2  C)] 97.5  F (36.4  C)  Pulse:  [77-84] 82  Resp:  [18-20] 18  BP: (120-137)/(74-83) 120/83  SpO2:  [97 %-98 %] 98 %    Intake/Output Summary (Last 24 hours) at 6/24/2024 0747  Last data filed at 6/23/2024 1444  Gross per 24 hour   Intake 951 ml   Output --   Net 951 ml       GENERAL:  Awake, alert, no acute distress, lying in bed comfortably  HEAD: Normocephalic atraumatic  SCLERA: Anicteric  EXTREMITIES: Warm and well perfused  ABDOMEN:  soft, nondistended, mildly tender LLQ and suprapubic    LABS:  Lab Results   Component Value Date    WBC 12.1 06/23/2024    WBC 5.6 06/25/2013     Lab Results   Component Value Date    HGB 13.7 06/23/2024    HGB 16.9 06/25/2013     Lab Results   Component Value Date    HCT 41.1 06/23/2024    HCT 49.5 06/25/2013     Lab Results   Component Value Date     06/23/2024     06/25/2013     Last Basic Metabolic Panel:  Lab Results   Component Value Date     06/23/2024     10/30/2011      Lab Results   Component Value Date    POTASSIUM 3.7 06/23/2024    POTASSIUM 4.3 10/30/2011     Lab Results   Component Value Date    CHLORIDE 103 06/23/2024    CHLORIDE 101 10/30/2011     Lab Results   Component Value Date    ULYSSES 8.3 06/23/2024    ULYSSES 10.0 10/30/2011     Lab Results   Component Value Date    CO2 25 06/23/2024    CO2 30 10/30/2011     Lab Results   Component Value Date    BUN 9.9 06/23/2024    BUN 13 10/30/2011     Lab Results   Component Value Date    CR 1.07 06/23/2024    CR 1.10 10/30/2011     Lab Results   Component Value Date    GLC 86 06/23/2024    GLC 91 10/30/2011       ASSESSMENT/PLAN: 28yoM with questionable history  of ileitis (Crohn's less likely given normal colonoscopy 3 years ago) who presents with perforated sigmoid diverticulitis with a ~5cm phlegmonous area in the pericolonic fat with small collections of air and fluid (not drainable per radiology). We are still working on getting imaging pushed to PACS from the Urgency [facility] [place].    Clinically, he is improving with no fevers and benign abd exam with only mild tenderness. Will start clear liquid diet and monitor exam and vitals (he is at high risk of abscess formation)    - clear liquid diet  - wean IV fluids  - OOB, ambulation  - lovenox for DVT ppx  - obtain outside CT images today    Pt d/w Dr. Khan.    Paolo Xiong MD, MS  Fellow, Colon & Rectal Surgery  [facility]  06/24/2024  7:55 AM    Colorectal Surgery can be reached at [phone] at all times. Between 5pm and 7am you will be connected to the on-call physician          Colorectal Surgery Staff:  I have seen and examined the patient. I agree with the above documentation and plan of the fellow/PA above with the following additions/chages:    Dennis is feeling better.  He has no pain when at rest.  He did not have any worsening of his pain with the clear liquids.  He is still passing gas and stools although he did notice some pain when he was having a bowel movement.    He is afebrile with normal vital signs.  His abdomen is soft, mild to moderately tender with deep palpation of the bilateral lower quadrants, nondistended.    Blood cell count is 12.1.    We are in the process of getting his images over from the emergency room.    A/P: 28M with diverticulitis and focal perforation/phlegmon.    And he is doing well with medical management.  Will continue clear liquids today.  Stop IV fluids.  Continue IV antibiotics.  We are currently in the process of getting the images.  If he continues to progress well clinically, we will advance his diet over the next few days and eventually transition him to  oral antibiotics and discharge him home.  If he does end up developing a fever, leukocytosis, or worsening pain, we will plan to repeat the CT scan as he does have a high chance of developing an abscess.    Total time spent including non-face-to-face time: 18 minutes    Segun Khan MD MBA  [facility]  Office: [phone]  6/24/2024 3:28 PM

## 2024-06-24 NOTE — PLAN OF CARE
Problem: Pain Acute  Goal: Optimal Pain Control and Function  Outcome: Progressing  Intervention: Prevent or Manage Pain  Recent Flowsheet Documentation  Taken 6/24/2024 0100 by Angelina Garzon RN  Medication Review/Management: medications reviewed     Problem: Infection  Goal: Absence of Infection Signs and Symptoms  Outcome: Progressing   Goal Outcome Evaluation:       Denies pain in this shift. Vss on RA. Independent in the room.

## 2024-06-24 NOTE — PLAN OF CARE
"  Problem: Adult Inpatient Plan of Care  Goal: Plan of Care Review  Description: The Plan of Care Review/Shift note should be completed every shift.  The Outcome Evaluation is a brief statement about your assessment that the patient is improving, declining, or no change.  This information will be displayed automatically on your shift  note.  Outcome: Progressing     Problem: Adult Inpatient Plan of Care  Goal: Absence of Hospital-Acquired Illness or Injury  Intervention: Prevent and Manage VTE (Venous Thromboembolism) Risk  Recent Flowsheet Documentation  Taken 6/23/2024 1634 by Laila Hastings RN  VTE Prevention/Management: SCDs (sequential compression devices) on     Problem: Adult Inpatient Plan of Care  Goal: Optimal Comfort and Wellbeing  Outcome: Progressing     Patient alert and oriented. Cooperative and friendly with cares and staff. Zosyn running at this time. Independent in room. Encouraged walk outside of room, pt refused stating he is \"just really tired today\". No complaints of pain this shift. Takes meds well. NPO ex. Ice and meds.   "

## 2024-06-24 NOTE — PLAN OF CARE
Goal Outcome Evaluation:      Plan of Care Reviewed With: patient    Overall Patient Progress: improving      Problem: Pain Acute  Goal: Optimal Pain Control and Function  Outcome: Progressing  Intervention: Prevent or Manage Pain  Recent Flowsheet Documentation  Taken 6/24/2024 1000 by Alla Escalante RN  Medication Review/Management: medications reviewed     Problem: Infection  Goal: Absence of Infection Signs and Symptoms  Outcome: Progressing  [id number]  No pain reported-only tender upon palpation. No n,v,d, or fevers present. Patient upgraded to clear liquids and tolerating.  Abx continued. VSS on RA.     Alla Escalante, RN

## 2024-06-25 PROCEDURE — 250N000013 HC RX MED GY IP 250 OP 250 PS 637: Performed by: INTERNAL MEDICINE

## 2024-06-25 PROCEDURE — 258N000003 HC RX IP 258 OP 636: Mod: JZ

## 2024-06-25 PROCEDURE — 250N000011 HC RX IP 250 OP 636: Mod: JZ | Performed by: STUDENT IN AN ORGANIZED HEALTH CARE EDUCATION/TRAINING PROGRAM

## 2024-06-25 PROCEDURE — 120N000001 HC R&B MED SURG/OB

## 2024-06-25 PROCEDURE — 250N000011 HC RX IP 250 OP 636: Mod: JZ | Performed by: INTERNAL MEDICINE

## 2024-06-25 RX ADMIN — ENOXAPARIN SODIUM 40 MG: 40 INJECTION SUBCUTANEOUS at 09:25

## 2024-06-25 RX ADMIN — SODIUM CHLORIDE: 9 INJECTION, SOLUTION INTRAVENOUS at 10:01

## 2024-06-25 RX ADMIN — NORTRIPTYLINE HYDROCHLORIDE 30 MG: 10 CAPSULE ORAL at 20:23

## 2024-06-25 RX ADMIN — PANTOPRAZOLE SODIUM 40 MG: 40 TABLET, DELAYED RELEASE ORAL at 20:22

## 2024-06-25 RX ADMIN — PIPERACILLIN AND TAZOBACTAM 3.38 G: 3; .375 INJECTION, POWDER, FOR SOLUTION INTRAVENOUS at 18:16

## 2024-06-25 RX ADMIN — PIPERACILLIN AND TAZOBACTAM 3.38 G: 3; .375 INJECTION, POWDER, FOR SOLUTION INTRAVENOUS at 01:02

## 2024-06-25 RX ADMIN — AMLODIPINE BESYLATE 10 MG: 5 TABLET ORAL at 20:22

## 2024-06-25 RX ADMIN — PIPERACILLIN AND TAZOBACTAM 3.38 G: 3; .375 INJECTION, POWDER, FOR SOLUTION INTRAVENOUS at 09:23

## 2024-06-25 ASSESSMENT — ACTIVITIES OF DAILY LIVING (ADL)
ADLS_ACUITY_SCORE: 20

## 2024-06-25 NOTE — PLAN OF CARE
Problem: Pain Acute  Goal: Optimal Pain Control and Function  Outcome: Progressing     Problem: Infection  Goal: Absence of Infection Signs and Symptoms  Outcome: Progressing     Goal Outcome Evaluation:  VSS on RA. Denies pain. Lower abdomen tender upon palpation. On clear liquid diet. Passing gas with no complaints of nausea. IV abx given per order. NS infusing. Up independently and able to make needs known.    Asad Gallardo RN

## 2024-06-25 NOTE — PLAN OF CARE
Goal Outcome Evaluation:      Plan of Care Reviewed With: patient    Overall Patient Progress: improving      Problem: Pain Acute  Goal: Optimal Pain Control and Function  Outcome: Progressing  Intervention: Prevent or Manage Pain  Recent Flowsheet Documentation  Taken 6/25/2024 0900 by Alla Escalante RN  Medication Review/Management: medications reviewed     Problem: Adult Inpatient Plan of Care  Goal: Optimal Comfort and Wellbeing  Outcome: Progressing     Problem: Adult Inpatient Plan of Care  Goal: Readiness for Transition of Care  Outcome: Progressing     Problem: Infection  Goal: Absence of Infection Signs and Symptoms  Outcome: Progressing     [id number]    No pain reported-pressure/tenderness present when abdomen is touched. ABX continued. IVF stopped. Diet upgraded to full liquids-tolerated well. Up and walking halls. Pt showered today. VSS on RA    Alla Escalante RN

## 2024-06-25 NOTE — PROGRESS NOTES
"Colon and Rectal Surgery  Daily Progress Note    Subjective  Mother in room this morning. Patient reports no abdominal pain, more of a \"pressure\" which has been slightly improved since admission. Tolerated advancement to clears yesterday without nausea or increase in pain. Passing flatus and small stools. Ambulating halls frequently. AVSS.     Objective  Intake/Output last 24 hrs:    Intake/Output Summary (Last 24 hours) at 6/25/2024 0915  Last data filed at 6/25/2024 0600  Gross per 24 hour   Intake 980 ml   Output --   Net 980 ml     Temp:  [97.9  F (36.6  C)-98.2  F (36.8  C)] 97.9  F (36.6  C)  Pulse:  [77-88] 84  Resp:  [16-18] 16  BP: (128-138)/(77-87) 138/86  SpO2:  [97 %-98 %] 97 %    Physical Exam:  General: awake, alert, laying in bed, in no acute distress  Head: normocephalic, atraumatic  Respiratory: non-labored breathing  Abdomen: soft, mild distended, tender to deep palpation in LLQ/suprapubic  Skin: No rashes or lesions  Musculoskeletal: moves all four extremities equally  Psychological: alert and oriented, answers questions appropriately    Pertinent Labs  Lab Results: personally reviewed.  Lab Results   Component Value Date     06/23/2024     10/30/2011    CO2 25 06/23/2024    CO2 30 10/30/2011    BUN 9.9 06/23/2024    BUN 13 10/30/2011     Lab Results   Component Value Date    WBC 12.1 06/23/2024    WBC 5.6 06/25/2013    WBC 10.8 10/30/2011    WBC 10.7 10/30/2011    HGB 13.7 06/23/2024    HGB 16.9 06/25/2013    HGB 14.5 10/30/2011    HGB 17.5 10/30/2011    HCT 41.1 06/23/2024    HCT 49.5 06/25/2013    HCT 42.0 10/30/2011    HCT 50.0 10/30/2011    MCV 88 06/23/2024    MCV 90 06/25/2013    MCV 88 10/30/2011    MCV 89 10/30/2011     06/23/2024     06/25/2013     10/30/2011     10/30/2011       Assessment/Plan: 28yoM with questionable history of ileitis (Crohn's less likely given normal colonoscopy 3 years ago) who presents with perforated sigmoid diverticulitis " with a ~5cm phlegmonous area in the pericolonic fat with small collections of air and fluid (not drainable per radiology). Clinically improving with IV abx and tolerated clear liquids yesterday. Will monitor exam and vitals closely (high risk of abscess formation)    - OK for full liquids; if nausea or increased pain back down to clears/NPO  - IV abx  - OOB/Ambulation x4  - Lovenox dvt ppx  - If continues to improve clinically, will need outpatient colonoscopy in 6-8 weeek  - Please alert CRS of changes in abdominal exam or clinical status    Discussed with Dr. Bill Call PA-C  [facility]  [phone]..............................main     Colorectal Surgery Staff:  I have seen and examined the patient. I agree with the above documentation and plan of the fellow/PA above with the following additions/chages:    Dennis is doing well.  He is no longer having pain.  He did well with the full liquids although because he has an allergy to drinking alcohol, for some reason that means that he cannot have many of the items on the full liquid diet menu.    He is afebrile with normal vital signs.  His abdomen is soft, nontender, nondistended.    No new labs today.    No new imaging.    A/P: 28M w/ perforated diverticulitis.    Dennis is recovering well.  I will try to remove the alcohol allergy from his list.  As long as he is still feeling well tomorrow, we will advance him to a low fiber diet and switch him over to Augmentin for a total of 10 days.  I will see him in the office for follow-up.  We discussed the fact that it is certainly possible that he develops worsening symptoms again such as increased pain, fevers, chills, and in that case, we would repeat this CAT scan to look for any abscess.  I will plan to see him in the office soon.    Total time spent including non-face-to-face time: 14 minutes    Segun Khan MD MBA  [facility]  Office:  [phone]  6/25/2024 7:03 PM

## 2024-06-26 VITALS
WEIGHT: 234.79 LBS | SYSTOLIC BLOOD PRESSURE: 137 MMHG | OXYGEN SATURATION: 96 % | HEIGHT: 67 IN | HEART RATE: 84 BPM | RESPIRATION RATE: 16 BRPM | TEMPERATURE: 98.2 F | DIASTOLIC BLOOD PRESSURE: 87 MMHG | BODY MASS INDEX: 36.85 KG/M2

## 2024-06-26 PROCEDURE — 250N000013 HC RX MED GY IP 250 OP 250 PS 637: Performed by: STUDENT IN AN ORGANIZED HEALTH CARE EDUCATION/TRAINING PROGRAM

## 2024-06-26 PROCEDURE — 250N000011 HC RX IP 250 OP 636: Mod: JZ | Performed by: INTERNAL MEDICINE

## 2024-06-26 RX ADMIN — AMOXICILLIN AND CLAVULANATE POTASSIUM 1 TABLET: 875; 125 TABLET, FILM COATED ORAL at 10:33

## 2024-06-26 RX ADMIN — PIPERACILLIN AND TAZOBACTAM 3.38 G: 3; .375 INJECTION, POWDER, FOR SOLUTION INTRAVENOUS at 01:44

## 2024-06-26 ASSESSMENT — ACTIVITIES OF DAILY LIVING (ADL)
ADLS_ACUITY_SCORE: 20

## 2024-06-26 NOTE — PLAN OF CARE
[id number]    Goal Outcome Evaluation: Vss. Patient has been sleeping overnight. Not reporting any pain. Able to make needs known. Independent in room.       Problem: Infection  Goal: Absence of Infection Signs and Symptoms  Outcome: Progressing     Problem: Fall Injury Risk  Goal: Absence of Fall and Fall-Related Injury  Outcome: Progressing  Intervention: Promote Injury-Free Environment  Recent Flowsheet Documentation  Taken 6/26/2024 0213 by Ale Bruce RN  Safety Promotion/Fall Prevention:   assistive device/personal items within reach   nonskid shoes/slippers when out of bed   room organization consistent   safety round/check completed     Problem: Pain Acute  Goal: Optimal Pain Control and Function  Outcome: Progressing  Intervention: Optimize Psychosocial Wellbeing  Recent Flowsheet Documentation  Taken 6/26/2024 0213 by Ale Bruce RN  Supportive Measures: decision-making supported

## 2024-06-26 NOTE — PLAN OF CARE
Uneventful shift.   Denies abdominal pain, or nausea.  Tolerating full liquid diet, and will be advanced to fiber low in AM.  Ambulating.  Reports some loose stools.  No new concerns.    Tiana Cortes RN

## 2024-06-26 NOTE — PROGRESS NOTES
Colon and Rectal Surgery  Daily Progress Note    Subjective  Patient reports feeling well this morning. He denies abdominal pain, nausea or vomiting. He tolerated full liquids without issues. He is passing gas and loose stools. He is hoping to go home today. AVSS.     Objective  Intake/Output last 24 hrs:    Intake/Output Summary (Last 24 hours) at 6/26/2024 0829  Last data filed at 6/25/2024 2200  Gross per 24 hour   Intake 1740 ml   Output --   Net 1740 ml     Temp:  [98  F (36.7  C)-98.3  F (36.8  C)] (P) 98.2  F (36.8  C)  Pulse:  [82-93] (P) 84  Resp:  [16-20] (P) 16  BP: (123-134)/(81-86) (P) 137/87  SpO2:  [96 %-97 %] (P) 96 %    Physical Exam:  General: awake, alert, lying in bed, in no acute distress  Head: normocephalic, atraumatic  Respiratory: non-labored breathing  Abdomen: soft, non-distended, non tender  Skin: No rashes or lesions  Musculoskeletal: moves all four extremities equally  Psychological: alert and oriented, answers questions appropriately    Pertinent Labs  Lab Results: personally reviewed.  Lab Results   Component Value Date     06/23/2024     10/30/2011    CO2 25 06/23/2024    CO2 30 10/30/2011    BUN 9.9 06/23/2024    BUN 13 10/30/2011     Lab Results   Component Value Date    WBC 12.1 06/23/2024    WBC 5.6 06/25/2013    WBC 10.8 10/30/2011    WBC 10.7 10/30/2011    HGB 13.7 06/23/2024    HGB 16.9 06/25/2013    HGB 14.5 10/30/2011    HGB 17.5 10/30/2011    HCT 41.1 06/23/2024    HCT 49.5 06/25/2013    HCT 42.0 10/30/2011    HCT 50.0 10/30/2011    MCV 88 06/23/2024    MCV 90 06/25/2013    MCV 88 10/30/2011    MCV 89 10/30/2011     06/23/2024     06/25/2013     10/30/2011     10/30/2011       Assessment/Plan: This is a 28 year old male who presents with perforated diverticulitis, improving with conservative management    - Advance to low fiber diet  - Transition to PO Augmentin, will do a total 10 day course of antibiotics  - Plan to discharge this  afternoon if he tolerates LFD without worsening abdominal pain or nausea  - If he develops increased pain, fevers, chills we will repeat a CT scan  - Our office will coordinate outpatient follow up with Dr. Khan and will plan for colonoscopy in 6-8 weeks.     Discussed with Dr. Bill Isbell PA-C  [facility]  [phone]..............................main

## 2024-06-26 NOTE — DISCHARGE SUMMARY
Discharge Summary    Patient name: Dilshad Causey  YOB: 1995   Age: 28 year old  Medical Record Number: [id number]  Primary Care Physician:  Laura Clemens       Admission Date: 6/22/2024.  Discharge Date: 6/26/2024.  Condition at Discharge: Stable       Principal Diagnosis:  Perforated diverticulitis    HISTORY OF PRESENT ILLNESS: Dilshad Causey is a 28M with a BMI of 36 and IBS presenting with complicated diverticulitis.    PROCEDURES PERFORMED DURING HOSPITALIZATION: none    BRIEF HOSPITAL COURSE: Dilshad Causey is a 28M with a BMI of 36 and IBS presenting with complicated diverticulitis with perforation on CT scan. There was a phlegmon noted that would not be amenable to drainage. His abdominal exam was reassuring, and he was managed conservatively with IV antibiotics and bowel rest. His symptoms improved and his diet was advances slowly to low fiber, and he was transitioned to PO antibiotics without difficulty. He did not have worsening pain or fevers, and was having bowel function. He was discharged to home on HD #4 with a total of 10 days of antibiotics.     PHYSICAL EXAM ON DATE OF DISCHARGE: see daily progrss note dated 6/26/24    Vital Signs in last 24 hours:   Temp:  [98  F (36.7  C)-98.3  F (36.8  C)] (P) 98.2  F (36.8  C)  Pulse:  [82-93] (P) 84  Resp:  [16-20] (P) 16  BP: (123-134)/(81-86) (P) 137/87  SpO2:  [96 %-97 %] (P) 96 %    COMPLICATIONS IN HOSPITAL: none    IMPORTANT PENDING TEST RESULTS: none    Discharge Medications/Orders:     Review of your medicines        START taking        Dose / Directions   amoxicillin-clavulanate 875-125 MG tablet  Commonly known as: AUGMENTIN  Indication: Infection Within the Abdomen, Diverticulitis  Used for: Acute diverticulitis      Dose: 1 tablet  Take 1 tablet by mouth every 12 hours for 7 days  Quantity: 14 tablet  Refills: 0            CONTINUE these medicines which have NOT CHANGED        Dose / Directions   amLODIPine 10  MG tablet  Commonly known as: NORVASC      Dose: 10 mg  Take 10 mg by mouth every evening  Refills: 0     hydrochlorothiazide 25 MG tablet  Commonly known as: HYDRODIURIL      Dose: 25 mg  Take 25 mg by mouth daily  Refills: 0     nortriptyline 10 MG capsule  Commonly known as: PAMELOR      Dose: 30 mg  Take 30 mg by mouth at bedtime  Refills: 0     order for DME  Used for: Sprain of ankle, unspecified site      Equipment being ordered: Trilock ankle brace for left ankle  Quantity: 1 each  Refills: 0     order for DME  Used for: Medial tibial stress syndrome      Equipment being ordered: Long leg aircast for right leg  Quantity: 1 each  Refills: 0     pantoprazole 40 MG EC tablet  Commonly known as: PROTONIX      Dose: 40 mg  Take 40 mg by mouth every evening  Refills: 0               Where to get your medicines        These medications were sent to [id number] - [place] [id number] [facility]  AT [facility] [address]  [address] [place] [address], [facility] [address]      Phone: [phone]   amoxicillin-clavulanate 875-125 MG tablet           FOLLOW UP: Follow up with Dr. Khan's office in 3-4 weeks to discuss possible surgery. We will plan to get a colonoscopy in about 6-8 weeks.    Total time spent for discharge on date of discharge: 20 minutes, with over half spent in counseling and coordinating care    I saw the patient on the date of discharge.    Carol Isbell PA-C  [facility]  [phone]

## 2024-06-26 NOTE — PLAN OF CARE
No pain currently or at any time prior to admission. No nausea. Have regular BM's although slightly small softer particles than normal. Tolerating full liquids. Now Low-fiber diet. Independent in room. Pt. asked if you could not take Lovenox as he could be going home today. I told him it would be necessary to be active, and walk frequently and he agreed. Educated pt and father about diverticulosis/diverticulitis, and discussed low fiber diet for current flare up and that high fiber diet will be recommended once flare up is resolved at direction of primary doctor. Pt asked about resuming Zepbound weight loss drug and CRS was paged, and they instructed to defer this to primary before resuming Zepbound which I communicated to patient. Tolerated a full breakfast with no problem. IV removed Discharge order in and AVS given to patient. He understands to how and when to take Augmentin.

## 2024-07-06 ENCOUNTER — HEALTH MAINTENANCE LETTER (OUTPATIENT)
Age: 29
End: 2024-07-06

## 2024-09-10 ENCOUNTER — ANESTHESIA EVENT (OUTPATIENT)
Dept: SURGERY | Facility: AMBULATORY SURGERY CENTER | Age: 29
End: 2024-09-10
Payer: COMMERCIAL

## 2024-09-11 ENCOUNTER — ANESTHESIA (OUTPATIENT)
Dept: SURGERY | Facility: AMBULATORY SURGERY CENTER | Age: 29
End: 2024-09-11
Payer: COMMERCIAL

## 2024-09-11 ENCOUNTER — HOSPITAL ENCOUNTER (OUTPATIENT)
Facility: AMBULATORY SURGERY CENTER | Age: 29
Discharge: HOME | End: 2024-09-11
Attending: COLON & RECTAL SURGERY
Payer: COMMERCIAL

## 2024-09-11 VITALS
OXYGEN SATURATION: 98 % | TEMPERATURE: 97.4 F | HEART RATE: 76 BPM | HEIGHT: 67 IN | RESPIRATION RATE: 16 BRPM | SYSTOLIC BLOOD PRESSURE: 119 MMHG | DIASTOLIC BLOOD PRESSURE: 76 MMHG | WEIGHT: 230 LBS | BODY MASS INDEX: 36.1 KG/M2

## 2024-09-11 LAB — COLONOSCOPY: NORMAL

## 2024-09-11 RX ORDER — ONDANSETRON 4 MG/1
4 TABLET, ORALLY DISINTEGRATING ORAL EVERY 30 MIN PRN
Status: DISCONTINUED | OUTPATIENT
Start: 2024-09-11 | End: 2024-09-12 | Stop reason: HOSPADM

## 2024-09-11 RX ORDER — LIDOCAINE 40 MG/G
CREAM TOPICAL
Status: DISCONTINUED | OUTPATIENT
Start: 2024-09-11 | End: 2024-09-12 | Stop reason: HOSPADM

## 2024-09-11 RX ORDER — PROPOFOL 10 MG/ML
INJECTION, EMULSION INTRAVENOUS PRN
Status: DISCONTINUED | OUTPATIENT
Start: 2024-09-11 | End: 2024-09-11

## 2024-09-11 RX ORDER — SODIUM CHLORIDE, SODIUM LACTATE, POTASSIUM CHLORIDE, CALCIUM CHLORIDE 600; 310; 30; 20 MG/100ML; MG/100ML; MG/100ML; MG/100ML
INJECTION, SOLUTION INTRAVENOUS CONTINUOUS
Status: DISCONTINUED | OUTPATIENT
Start: 2024-09-11 | End: 2024-09-12 | Stop reason: HOSPADM

## 2024-09-11 RX ORDER — ONDANSETRON 2 MG/ML
4 INJECTION INTRAMUSCULAR; INTRAVENOUS EVERY 30 MIN PRN
Status: DISCONTINUED | OUTPATIENT
Start: 2024-09-11 | End: 2024-09-12 | Stop reason: HOSPADM

## 2024-09-11 RX ORDER — PROPOFOL 10 MG/ML
INJECTION, EMULSION INTRAVENOUS CONTINUOUS PRN
Status: DISCONTINUED | OUTPATIENT
Start: 2024-09-11 | End: 2024-09-11

## 2024-09-11 RX ORDER — GLYCOPYRROLATE 0.2 MG/ML
INJECTION, SOLUTION INTRAMUSCULAR; INTRAVENOUS PRN
Status: DISCONTINUED | OUTPATIENT
Start: 2024-09-11 | End: 2024-09-11

## 2024-09-11 RX ORDER — NALOXONE HYDROCHLORIDE 0.4 MG/ML
0.1 INJECTION, SOLUTION INTRAMUSCULAR; INTRAVENOUS; SUBCUTANEOUS
Status: DISCONTINUED | OUTPATIENT
Start: 2024-09-11 | End: 2024-09-12 | Stop reason: HOSPADM

## 2024-09-11 RX ORDER — ACETAMINOPHEN 325 MG/1
975 TABLET ORAL ONCE
Status: DISCONTINUED | OUTPATIENT
Start: 2024-09-11 | End: 2024-09-12 | Stop reason: HOSPADM

## 2024-09-11 RX ORDER — LIDOCAINE HYDROCHLORIDE 20 MG/ML
INJECTION, SOLUTION INFILTRATION; PERINEURAL PRN
Status: DISCONTINUED | OUTPATIENT
Start: 2024-09-11 | End: 2024-09-11

## 2024-09-11 RX ORDER — ONDANSETRON 4 MG/1
4 TABLET, ORALLY DISINTEGRATING ORAL
Status: DISCONTINUED | OUTPATIENT
Start: 2024-09-11 | End: 2024-09-12 | Stop reason: HOSPADM

## 2024-09-11 RX ORDER — DEXAMETHASONE SODIUM PHOSPHATE 4 MG/ML
4 INJECTION, SOLUTION INTRA-ARTICULAR; INTRALESIONAL; INTRAMUSCULAR; INTRAVENOUS; SOFT TISSUE
Status: DISCONTINUED | OUTPATIENT
Start: 2024-09-11 | End: 2024-09-12 | Stop reason: HOSPADM

## 2024-09-11 RX ADMIN — LIDOCAINE HYDROCHLORIDE 50 MG: 20 INJECTION, SOLUTION INFILTRATION; PERINEURAL at 09:48

## 2024-09-11 RX ADMIN — PROPOFOL 300 MCG/KG/MIN: 10 INJECTION, EMULSION INTRAVENOUS at 09:50

## 2024-09-11 RX ADMIN — PROPOFOL 20 MG: 10 INJECTION, EMULSION INTRAVENOUS at 09:52

## 2024-09-11 RX ADMIN — GLYCOPYRROLATE 0.2 MG: 0.2 INJECTION, SOLUTION INTRAMUSCULAR; INTRAVENOUS at 09:48

## 2024-09-11 RX ADMIN — PROPOFOL 30 MG: 10 INJECTION, EMULSION INTRAVENOUS at 09:50

## 2024-09-11 RX ADMIN — SODIUM CHLORIDE, SODIUM LACTATE, POTASSIUM CHLORIDE, CALCIUM CHLORIDE: 600; 310; 30; 20 INJECTION, SOLUTION INTRAVENOUS at 09:02

## 2024-09-11 RX ADMIN — PROPOFOL 20 MG: 10 INJECTION, EMULSION INTRAVENOUS at 09:59

## 2024-09-11 NOTE — ANESTHESIA POSTPROCEDURE EVALUATION
Patient: Dilshad Causey    Procedure: Procedure(s):  COLONOSCOPY       Anesthesia Type:  MAC    Note:  Disposition: Outpatient   Postop Pain Control: Uneventful            Sign Out: Well controlled pain   PONV: No   Neuro/Psych: Uneventful            Sign Out: Acceptable/Baseline neuro status   Airway/Respiratory: Uneventful            Sign Out: AIRWAY IN SITU/Resp. Support   CV/Hemodynamics: Uneventful            Sign Out: Acceptable CV status; No obvious hypovolemia; No obvious fluid overload   Other NRE: NONE   DID A NON-ROUTINE EVENT OCCUR? No           Last vitals:  Vitals Value Taken Time   /75 09/11/24 1032   Temp 97.4  F (36.3  C) 09/11/24 1015   Pulse 85 09/11/24 1036   Resp 16 09/11/24 1020   SpO2 98 % 09/11/24 1036   Vitals shown include unfiled device data.    Electronically Signed By: Jeremy Alvarez MD  September 11, 2024  10:39 AM

## 2024-09-11 NOTE — ANESTHESIA PREPROCEDURE EVALUATION
Anesthesia Pre-Procedure Evaluation    Patient: Dilshad Causey   MRN: [id number] : 1995        Procedure : Procedure(s):  COLONOSCOPY          Past Medical History:   Diagnosis Date    Acne     on amoxicillin    Hypertension     Migraines     Noninfectious ileitis       Past Surgical History:   Procedure Laterality Date    ANKLE SURGERY Left     tendon surgery    COMBINED CYSTOSCOPY, INSERT STENT URETER(S) Right 2015    Procedure: CYSTOSCOPY URETEROSCOPIC STONE EXTRACTION WITH STENT PLACEMENT;  Surgeon: David Garcia MD;  Location: [facility];  Service:     ESOPHAGOSCOPY, GASTROSCOPY, DUODENOSCOPY (EGD), COMBINED  2019    Dr. Watts  [facility]    ESOPHAGOSCOPY, GASTROSCOPY, DUODENOSCOPY (EGD), COMBINED N/A 2019    Procedure: ESOPHAGOGASTRODUODENOSCOPY (EGD);  Surgeon: Feliberto Watts MD;  Location:  GI    FASCIOTOMY Bilateral     FASCIOTOMY LOWER EXTREMITY  10/30/2011    Procedure:FASCIOTOMY LOWER EXTREMITY; Excision Hematoma LLE, Explore Compartments LLE; Surgeon:FELIBERTO MCKEON; Location:[facility]    FASCIOTOMY LOWER EXTREMITY BILATERAL  10/19/2011    Procedure:FASCIOTOMY LOWER EXTREMITY BILATERAL; Bilateral Anterior / Lateral Compartment Fasciotomies  ; Surgeon:FELIBERTO MCKEON; Location:[facility]    none      ORTHOPEDIC SURGERY      left ankle repair of tendon      Allergies   Allergen Reactions    Cefprozil [Cefprozil] Hives     24 Zosyn given at [facility]    Lisinopril Cough      Social History     Tobacco Use    Smoking status: Never    Smokeless tobacco: Never   Substance Use Topics    Alcohol use: No      Wt Readings from Last 1 Encounters:   24 104.3 kg (230 lb)        Anesthesia Evaluation   Pt has had prior anesthetic.     No history of anesthetic complications       ROS/MED HX  ENT/Pulmonary:  - neg pulmonary ROS     Neurologic:  - neg neurologic ROS   (+)      migraines,                          Cardiovascular:  - neg  "cardiovascular ROS   (+)  hypertension- -   -  - -                                      METS/Exercise Tolerance:     Hematologic:  - neg hematologic  ROS     Musculoskeletal:  - neg musculoskeletal ROS     GI/Hepatic: Comment: diverticulitis - neg GI/hepatic ROS   (+)        bowel prep,            Renal/Genitourinary:  - neg Renal ROS     Endo:  - neg endo ROS   (+)               Obesity (bmi 36),       Psychiatric/Substance Use:  - neg psychiatric ROS     Infectious Disease:  - neg infectious disease ROS     Malignancy:  - neg malignancy ROS     Other:  - neg other ROS          Physical Exam    Airway        Mallampati: II   TM distance: > 3 FB   Neck ROM: full   Mouth opening: > 3 cm    Respiratory Devices and Support         Dental       (+) Minor Abnormalities - some fillings, tiny chips      Cardiovascular   cardiovascular exam normal          Pulmonary   pulmonary exam normal                OUTSIDE LABS:  CBC:   Lab Results   Component Value Date    WBC 12.1 (H) 06/23/2024    WBC 5.6 06/25/2013    HGB 13.7 06/23/2024    HGB 16.9 06/25/2013    HCT 41.1 06/23/2024    HCT 49.5 06/25/2013     06/23/2024     06/25/2013     BMP:   Lab Results   Component Value Date     06/23/2024     10/30/2011    POTASSIUM 3.7 06/23/2024    POTASSIUM 4.3 10/30/2011    CHLORIDE 103 06/23/2024    CHLORIDE 101 10/30/2011    CO2 25 06/23/2024    CO2 30 10/30/2011    BUN 9.9 06/23/2024    BUN 13 10/30/2011    CR 1.07 06/23/2024    CR 1.10 (H) 10/30/2011    GLC 86 06/23/2024    GLC 91 10/30/2011     COAGS:   Lab Results   Component Value Date    PTT 27 06/25/2013    INR 1.02 06/25/2013     POC:   Lab Results   Component Value Date     (H) 10/31/2011     HEPATIC: No results found for: \"ALBUMIN\", \"PROTTOTAL\", \"ALT\", \"AST\", \"GGT\", \"ALKPHOS\", \"BILITOTAL\", \"BILIDIRECT\", \"JOANN\"  OTHER:   Lab Results   Component Value Date    ULYSSES 8.3 (L) 06/23/2024       Anesthesia Plan    ASA Status:  2    NPO Status:  NPO " "Appropriate    Anesthesia Type: MAC.     - Reason for MAC: immobility needed, straight local not clinically adequate   Induction: Propofol.   Maintenance: TIVA.        Consents    Anesthesia Plan(s) and associated risks, benefits, and realistic alternatives discussed. Questions answered and patient/representative(s) expressed understanding.     - Discussed:     - Discussed with:  Patient            Postoperative Care    Pain management: Multi-modal analgesia.        Comments:    Other Comments: Propofol    Anesthetic risks, benefits, and options reviewed. Patient agrees to proceed.               Jeremy Alvarez MD    I have reviewed the pertinent notes and labs in the chart from the past 30 days and (re)examined the patient.  Any updates or changes from those notes are reflected in this note.              # Obesity: Estimated body mass index is 36.02 kg/m  as calculated from the following:    Height as of this encounter: 1.702 m (5' 7\").    Weight as of this encounter: 104.3 kg (230 lb).      "

## 2024-09-11 NOTE — ANESTHESIA CARE TRANSFER NOTE
Patient: Dilshad Causey    Procedure: Procedure(s):  COLONOSCOPY       Diagnosis: Encounter for screening colonoscopy [Z12.11]  Diagnosis Additional Information: No value filed.    Anesthesia Type:   MAC     Note:    Oropharynx: oropharynx clear of all foreign objects  Level of Consciousness: drowsy  Oxygen Supplementation: face mask  Level of Supplemental Oxygen (L/min / FiO2): 8  Independent Airway: airway patency satisfactory and stable  Dentition: dentition unchanged  Vital Signs Stable: post-procedure vital signs reviewed and stable  Report to RN Given: handoff report given  Patient transferred to: Phase II    Handoff Report: Identifed the Patient, Identified the Reponsible Provider, Reviewed the pertinent medical history, Discussed the surgical course, Reviewed Intra-OP anesthesia mangement and issues during anesthesia, Set expectations for post-procedure period and Allowed opportunity for questions and acknowledgement of understanding      Vitals:  Vitals Value Taken Time   /71 09/11/24 1015   Temp 37C    Pulse 80    Resp 16 09/11/24 1015   SpO2 98 % 09/11/24 1015       Electronically Signed By: AIDEN Rojas CRNA  September 11, 2024  10:15 AM

## 2024-09-11 NOTE — DISCHARGE INSTRUCTIONS
If you have any questions or concerns regarding your procedure, please contact Dr. Khan, his office number is [phone].    Discharge Instructions:    Take you medications as directed and follow up with you primary provider as scheduled.   You should expect to pass air from your rectum after the procedure.   Follow these care guidelines during your recovery for the next 24 hours.   If you have any questions or concerns please contact the provider that performed your procedure.     You were given medicine for sedation:  You have been given medicines during your procedure that might make you sleepy and weak. To prevent problems:    *Rest for the rest of the day after you are home. You should be back to your normal activity tomorrow.  *For the next 24 hours:   -Do not drink alcoholic beverages.   -Do not make any important decisions or sign any legal forms.   -Do not work around machinery or power equipment.     The medicines used for sedation may make you feel nauseated.   *Start with clear liquids, such as tea, jell-o, broth and ginger ale. As you feel better you may add soft foods such as pudding and ice cream.   *When you no longer feel nauseated, you may try your normal diet.     You should be back to eating your normal after 24 hours.     Call if you have any of these problems:  *Fever of 101 degree F or 38 degrees C  *Bleeding from the rectum  *Black stool or blood in your bowel movements  *Nausea with vomiting that does not ease after a few hours.  *Abdominal pain or bloating  *Fainting      [facility]   Adult Discharge Orders & Instructions     For 24 hours after surgery    Get plenty of rest.  A responsible adult must stay with you for at least 24 hours after you leave the hospital.   Do not drive or use heavy equipment.  If you have weakness or tingling, don't drive or use heavy equipment until this feeling goes away.  Do not drink alcohol.  Avoid strenuous or risky activities.  Ask for  help when climbing stairs.   You may feel lightheaded.  IF so, sit for a few minutes before standing.  Have someone help you get up.   If you have nausea (feel sick to your stomach): Drink only clear liquids such as apple juice, ginger ale, broth or 7-Up.  Rest may also help.  Be sure to drink enough fluids.  Move to a regular diet as you feel able.  You may have a slight fever. Call the doctor if your fever is over 100 F (37.7 C) (taken under the tongue) or lasts longer than 24 hours.  You may have a dry mouth, a sore throat, muscle aches or trouble sleeping.  These should go away after 24 hours.  Do not make important or legal decisions.   Call your doctor for any of the followin.  Signs of infection (fever, growing tenderness at the surgery site, a large amount of drainage or bleeding, severe pain, foul-smelling drainage, redness, swelling).    2. It has been over 8 to 10 hours since surgery and you are still not able to urinate (pass water).    3.  Headache for over 24 hours.

## 2024-09-11 NOTE — H&P
[facility]   History and Physical       History of Present Illness: 29 year old male w/ hx of diverticulitis here for colonoscopy.     Past Medical History:   Diagnosis Date    Acne     on amoxicillin    Hypertension     Migraines     Noninfectious ileitis        Allergies   Allergen Reactions    Cefprozil [Cefprozil] Hives     6/22/24 Zosyn given at [facility]    Lisinopril Cough       Past Surgical History:   Procedure Laterality Date    ANKLE SURGERY Left     tendon surgery    COMBINED CYSTOSCOPY, INSERT STENT URETER(S) Right 6/12/2015    Procedure: CYSTOSCOPY URETEROSCOPIC STONE EXTRACTION WITH STENT PLACEMENT;  Surgeon: David Garcia MD;  Location: [facility];  Service:     ESOPHAGOSCOPY, GASTROSCOPY, DUODENOSCOPY (EGD), COMBINED  12/18/2019    Dr. Watts  [facility]    ESOPHAGOSCOPY, GASTROSCOPY, DUODENOSCOPY (EGD), COMBINED N/A 12/18/2019    Procedure: ESOPHAGOGASTRODUODENOSCOPY (EGD);  Surgeon: Feliberto Watts MD;  Location:  GI    FASCIOTOMY Bilateral     FASCIOTOMY LOWER EXTREMITY  10/30/2011    Procedure:FASCIOTOMY LOWER EXTREMITY; Excision Hematoma LLE, Explore Compartments LLE; Surgeon:FELIBERTO MCKEON; Location:[facility]    FASCIOTOMY LOWER EXTREMITY BILATERAL  10/19/2011    Procedure:FASCIOTOMY LOWER EXTREMITY BILATERAL; Bilateral Anterior / Lateral Compartment Fasciotomies  ; Surgeon:FELIBERTO MCKEON; Location: OR    none      ORTHOPEDIC SURGERY  2013    left ankle repair of tendon       Family History   Problem Relation Age of Onset    Neurologic Disorder Mother         migraine    Diabetes Father         type 1    Cancer Maternal Grandmother         unkown source    Heart Disease Maternal Grandfather         quadruple bypass    Diabetes Paternal Grandmother         type 2    Cancer Paternal Grandmother         renal    Cancer Paternal Grandfather         bladder and non hodgkin's lymphoma    Heart Disease Paternal Grandfather         heart  stents    Neurologic Disorder Other         epilipsy    Hypertension Other     High cholesterol Other     Alcohol/Drug Other     Heart Disease Other     Cancer Other     Colon Cancer No family hx of     Anesthesia Reaction No family hx of     Malignant Hyperthermia No family hx of        Social History     Socioeconomic History    Marital status: Single     Spouse name: Not on file    Number of children: Not on file    Years of education: Not on file    Highest education level: Not on file   Occupational History    Not on file   Tobacco Use    Smoking status: Never    Smokeless tobacco: Never   Substance and Sexual Activity    Alcohol use: No    Drug use: No    Sexual activity: Not on file   Other Topics Concern    Parent/sibling w/ CABG, MI or angioplasty before 65F 55M? Not Asked   Social History Narrative    Not on file     Social Determinants of Health     Financial Resource Strain: Low Risk  (2/2/2024)    Received from [facility]    Financial Resource Strain     Difficulty of Paying Living Expenses: 3     Difficulty of Paying Living Expenses: Not on file   Food Insecurity: No Food Insecurity (2/2/2024)    Received from [facility]    Food Insecurity     Worried About Running Out of Food in the Last Year: 1   Transportation Needs: No Transportation Needs (2/2/2024)    Received from [facility]    Transportation Needs     Lack of Transportation (Medical): 1   Physical Activity: Not on file   Stress: Not on file   Social Connections: Socially Integrated (2/2/2024)    Received from [facility]    Social Connections     Frequency of Communication with Friends and Family: 0   Interpersonal Safety: Not on file   Housing Stability: Low Risk  (2/2/2024)    Received from [facility]    Housing Stability     Unable to Pay for Housing in the Last Year: 1        Current Outpatient Medications   Medication Sig Dispense Refill    amLODIPine (NORVASC) 10 MG tablet Take 10 mg by mouth every evening      hydrochlorothiazide (HYDRODIURIL) 25 MG tablet Take 25 mg by mouth daily      nortriptyline (PAMELOR) 10 MG capsule Take 30 mg by mouth at bedtime      pantoprazole (PROTONIX) 40 MG EC tablet Take 40 mg by mouth every evening      ORDER FOR DME Equipment being ordered: Trilock ankle brace for left ankle 1 each 0    ORDER FOR DME Equipment being ordered: Long leg aircast for right leg 1 each 0     Current Facility-Administered Medications   Medication Dose Route Frequency Provider Last Rate Last Admin    lactated ringers infusion   Intravenous Continuous Armin Napoles  mL/hr at 09/11/24 0902 Restarted at 09/11/24 0930    lidocaine (LMX4) kit   Topical Q1H PRN Armin Napoles MD        lidocaine 1 % 0.1-1 mL  0.1-1 mL Other Q1H PRN Armin Napoles MD        sodium chloride (PF) 0.9% PF flush 3 mL  3 mL Intracatheter Q8H Armin Napoles MD        sodium chloride (PF) 0.9% PF flush 3 mL  3 mL Intracatheter q1 min prn Armin Napoles MD           Review of Systems:   A full 10 point review of systems was taken and is negative aside from what is noted above in the HPI.    Consitutional / General: Denies wt changes, fevers, chills or sweats.  Skin: Denies rashes, bruising, pruritis, or bleeding tendencies.   ENT: Denies trauma, headache, hearing loss, tinnitus, dysphagia  Eyes: Denies double vision  Respiratory: Denies SOB, cough, sputum production or dyspnea on exertion.   Cardiovascular: Denies chest pain, palpitations, orthostatic hypotension  Hematology / Lymph: Denies hx of blood clots or pulmonary embolism  Gastrointestinal:  Denies loss of appetite, dysphagia, N/V, constipation or diarrhea.   Genitourinary: Denies dysuria, frequency, urgency, hesitancy, incontinence, nocturia, hematuria, difficulty starting or stopping their stream, hx of stones  or hx of uti's.   Neurologic: Denies headache, LOC, memory loss, vertigo, syncope or seizures.   Musculoskeletal: Denies back pain, numbness, tingling or hx of back surgery  Psychological: alert and oriented    Intake/Output past 24 hrs:  No intake or output data in the 24 hours ending 09/11/24 0946    Physical Exam:  Temp:  [97.5  F (36.4  C)] 97.5  F (36.4  C)  Pulse:  [81] 81  Resp:  [16] 16  BP: (139)/(93) 139/93  SpO2:  [97 %] 97 %  General: NAD, alert, cooperative  Head: normocephalic, without abnormality / atraumatic  Respiratory: non-labored breathing, CTA-B  Cardiac: RRR +S1/S2  Abdomen: soft, non-tender, non-distended.  No guarding or rebound   Perianal/Rectal: deferred   Skin: no rashes or lesions  Musculoskeletal: moves all four extremities equally; no calf edema or tenderness  Psychological: alert and oriented, answers questions appropriately  Neurological: cranial nerves grossly intact    CBC RESULTS:   Recent Labs   Lab Test 06/23/24  0608   WBC 12.1*   RBC 4.68   HGB 13.7   HCT 41.1   MCV 88   MCH 29.3   MCHC 33.3   RDW 13.1          Last Comprehensive Metabolic Panel:  Sodium   Date Value Ref Range Status   06/23/2024 140 135 - 145 mmol/L Final     Comment:     Reference intervals for this test were updated on 09/26/2023 to more accurately reflect our healthy population. There may be differences in the flagging of prior results with similar values performed with this method. Interpretation of those prior results can be made in the context of the updated reference intervals.    10/30/2011 142 133 - 144 mmol/L Final     Potassium   Date Value Ref Range Status   06/23/2024 3.7 3.4 - 5.3 mmol/L Final   10/30/2011 4.3 3.4 - 5.3 mmol/L Final     Chloride   Date Value Ref Range Status   06/23/2024 103 98 - 107 mmol/L Final   10/30/2011 101 98 - 110 mmol/L Final     Carbon Dioxide   Date Value Ref Range Status   10/30/2011 30 20 - 32 mmol/L Final     Carbon Dioxide (CO2)   Date Value Ref Range Status    06/23/2024 25 22 - 29 mmol/L Final     Anion Gap   Date Value Ref Range Status   06/23/2024 12 7 - 15 mmol/L Final   10/30/2011 10.2 6 - 17 mmol/L Final     Glucose   Date Value Ref Range Status   06/23/2024 86 70 - 99 mg/dL Final   10/30/2011 91 60 - 99 mg/dL Final     Urea Nitrogen   Date Value Ref Range Status   06/23/2024 9.9 6.0 - 20.0 mg/dL Final   10/30/2011 13 5 - 24 mg/dL Final     Creatinine   Date Value Ref Range Status   06/23/2024 1.07 0.67 - 1.17 mg/dL Final   10/30/2011 1.10 (H) 0.50 - 1.00 mg/dL Final     GFR Estimate   Date Value Ref Range Status   06/23/2024 >90 >60 mL/min/1.73m2 Final     Comment:     eGFR calculated using 2021 CKD-EPI equation.   10/30/2011 87 >60 mL/min/1.7m2 Final     Calcium   Date Value Ref Range Status   06/23/2024 8.3 (L) 8.6 - 10.0 mg/dL Final   10/30/2011 10.0 8.7 - 10.8 mg/dL Final       Assessment: Dilshad Causey is a 29 year old male presenting for surveillance colonoscopy.     Plan: Colonoscopy under SILVIA Khan MD, [facility]  [facility]  Office: [phone]  9/11/2024 9:46 AM

## 2025-07-13 ENCOUNTER — HEALTH MAINTENANCE LETTER (OUTPATIENT)
Age: 30
End: 2025-07-13

## (undated) DEVICE — KIT ENDO TURNOVER/PROCEDURE W/CLEAN A SCOPE LINERS 103888

## (undated) DEVICE — ENDO BITE BLOCK ADULT OLYMPUS LATEX FREE MAJ-1632

## (undated) RX ORDER — DIPHENHYDRAMINE HYDROCHLORIDE 50 MG/ML
INJECTION INTRAMUSCULAR; INTRAVENOUS
Status: DISPENSED
Start: 2019-12-18

## (undated) RX ORDER — FENTANYL CITRATE 50 UG/ML
INJECTION, SOLUTION INTRAMUSCULAR; INTRAVENOUS
Status: DISPENSED
Start: 2019-12-18